# Patient Record
Sex: FEMALE | Race: WHITE | Employment: UNEMPLOYED | URBAN - METROPOLITAN AREA
[De-identification: names, ages, dates, MRNs, and addresses within clinical notes are randomized per-mention and may not be internally consistent; named-entity substitution may affect disease eponyms.]

---

## 2020-08-10 ENCOUNTER — OFFICE VISIT (OUTPATIENT)
Dept: ORTHOPEDIC SURGERY | Age: 40
End: 2020-08-10
Payer: COMMERCIAL

## 2020-08-10 VITALS — BODY MASS INDEX: 23.05 KG/M2 | HEIGHT: 64 IN | WEIGHT: 135 LBS

## 2020-08-10 PROCEDURE — 99203 OFFICE O/P NEW LOW 30 MIN: CPT | Performed by: ORTHOPAEDIC SURGERY

## 2020-08-10 PROCEDURE — G8427 DOCREV CUR MEDS BY ELIG CLIN: HCPCS | Performed by: ORTHOPAEDIC SURGERY

## 2020-08-10 PROCEDURE — G8420 CALC BMI NORM PARAMETERS: HCPCS | Performed by: ORTHOPAEDIC SURGERY

## 2020-08-10 PROCEDURE — 4004F PT TOBACCO SCREEN RCVD TLK: CPT | Performed by: ORTHOPAEDIC SURGERY

## 2020-08-10 RX ORDER — METHYLPREDNISOLONE ACETATE 40 MG/ML
80 INJECTION, SUSPENSION INTRA-ARTICULAR; INTRALESIONAL; INTRAMUSCULAR; SOFT TISSUE ONCE
Status: COMPLETED | OUTPATIENT
Start: 2020-08-10 | End: 2020-08-10

## 2020-08-10 RX ORDER — BUPIVACAINE HYDROCHLORIDE 2.5 MG/ML
30 INJECTION, SOLUTION INFILTRATION; PERINEURAL ONCE
Status: COMPLETED | OUTPATIENT
Start: 2020-08-10 | End: 2020-08-10

## 2020-08-10 RX ORDER — LIDOCAINE HYDROCHLORIDE 10 MG/ML
20 INJECTION, SOLUTION INFILTRATION; PERINEURAL ONCE
Status: COMPLETED | OUTPATIENT
Start: 2020-08-10 | End: 2020-08-10

## 2020-08-10 RX ADMIN — BUPIVACAINE HYDROCHLORIDE 75 MG: 2.5 INJECTION, SOLUTION INFILTRATION; PERINEURAL at 17:11

## 2020-08-10 RX ADMIN — METHYLPREDNISOLONE ACETATE 80 MG: 40 INJECTION, SUSPENSION INTRA-ARTICULAR; INTRALESIONAL; INTRAMUSCULAR; SOFT TISSUE at 17:11

## 2020-08-10 RX ADMIN — LIDOCAINE HYDROCHLORIDE 20 ML: 10 INJECTION, SOLUTION INFILTRATION; PERINEURAL at 17:11

## 2020-08-10 NOTE — PROGRESS NOTES
CHIEF COMPLAINT:    Chief Complaint   Patient presents with    Shoulder Pain     LEFT SHOULDER PAIN       HISTORY OF PRESENT ILLNESS:                The patient is a 42 y.o. female this patient presents today for orthopedic evaluation of her life shoulder. She states that for about 2 months has had intermittent but progressively worsening shoulder pain. She denies any known precipitating event or trauma. She denies any constitutional symptoms. She states she does use her arms quite a bit at her job which further aggravates her symptoms. She denies any numbness or tingling  Past Medical History:   Diagnosis Date    Arthritis     states yes and takes advil    Depression     Hepatitis C     states Hep C    Heroin use     Migraine     MRSA (methicillin resistant staph aureus) culture positive 5/5/13    Positive right hand cx        Work Status: She works at [employer]    The pain assessment was noted & is as follows:  Pain Assessment  Location of Pain: Shoulder  Location Modifiers: Left  Severity of Pain: 4  Quality of Pain: Aching  Duration of Pain: Persistent  Frequency of Pain: Constant]      Work Status/Functionality:     Past Medical History: Medical history form was reviewed today & can be found in the media tab  Past Medical History:   Diagnosis Date    Arthritis     states yes and takes advil    Depression     Hepatitis C     states Hep C    Heroin use     Migraine     MRSA (methicillin resistant staph aureus) culture positive 5/5/13    Positive right hand cx      Past Surgical History:     Past Surgical History:   Procedure Laterality Date    HAND SURGERY  5/5/13    INCISION AND DRAINAGE RT HAND    TUBAL LIGATION       Current Medications:     Current Outpatient Medications:     sertraline (ZOLOFT) 25 MG tablet, Take 25 mg by mouth daily. , Disp: , Rfl:     lamoTRIgine (LAMICTAL) 25 MG tablet, Take 25 mg by mouth 2 times daily. , Disp: , Rfl:     cloNIDine (CATAPRES) 0.1 MG tablet, Take 1 tablet by mouth 3 times daily as needed (withdrawal symptoms). , Disp: 5 tablet, Rfl: 0    dicyclomine (BENTYL) 20 MG tablet, Take 1 tablet by mouth every 6 hours as needed (abdominal cramping/pain) for 15 doses. , Disp: 15 tablet, Rfl: 0    FLUoxetine (PROZAC) 20 MG capsule, Take 1 capsule by mouth daily. Indications: mood, Disp: 30 capsule, Rfl: 0    OLANZapine (ZYPREXA) 5 MG tablet, Take 1 tablet by mouth nightly. Take half in am and half in afternoon prn anxiety. Take whole tab at bedtime  Indications: mood, Disp: 60 tablet, Rfl: 0  Allergies:  Robaxin [methocarbamol]  Social History:    reports that she has been smoking cigarettes. She has a 1.00 pack-year smoking history. She has never used smokeless tobacco. She reports current drug use. She reports that she does not drink alcohol. Family History:   Family History   Problem Relation Age of Onset    Diabetes Mother     High Cholesterol Mother     Diabetes Father     High Blood Pressure Father        REVIEW OF SYSTEMS:   For new problems, a full review of systems will be found scanned in the patient's chart. CONSTITUTIONAL: Denies unexplained weight loss, fevers, chills   NEUROLOGICAL: Denies unsteady gait or progressive weakness  SKIN: Denies skin changes, delayed healing, rash, itching       PHYSICAL EXAM:    Vitals: Height 5' 4\" (1.626 m), weight 135 lb (61.2 kg). GENERAL EXAM:  · General Apparence: Patient is adequately groomed with no evidence of malnutrition. · Orientation: The patient is oriented to time, place and person. · Mood & Affect:The patient's mood and affect are appropriate       LEFT shoulder PHYSICAL EXAMINATION:  · Inspection: I do not see any erythema, no significant swelling appreciable, no ecchymosis or abrasion. · Palpation: Mild musculoskeletal tenderness through the trapezius musculature.   No bony tenderness at the distal acromion or AC joint    · Range of Motion: Full range of motion today, mild discomfort particularly at internal/external rotation extremes. · Strength: No gross weakness with resisted supraspinatus or infraspinatus testing, very mild discomfort. · Special Tests: She can feel touch throughout the LEFT upper extremity          · Skin:  There are no rashes, ulcerations or lesions. · There are no distal dysvascular changes     Gait & station: The patient ambulates today unassisted      Additional Examinations:        Right Upper Extremity:  Examination of the right upper extremity does not show any tenderness, deformity or injury. Range of motion is unremarkable. There is no gross instability. There are no rashes, ulcerations or lesions. Strength and tone are normal.      Diagnostic Testing: The following x rays were read and interpreted by myself      1.  3 x-ray views of the LEFT shoulder reviewed today, minimally slipped acromion. No significant degenerative changes or acute bony abnormalities. Orders     Orders Placed This Encounter   Procedures    XR SHOULDER LEFT (MIN 2 VIEWS)     Standing Status:   Future     Number of Occurrences:   1     Standing Expiration Date:   8/10/2021     Order Specific Question:   Reason for exam:     Answer:   PAIN         Assessment / Treatment Plan:     1. LEFT shoulder rotator cuff tendinitis    Discussed treatment options. We are going to try cortisone injection today and a home exercise program.  If symptoms persist, we may need to try some formal physical therapy    I discussed in detail the risk, benefits, and complications of an injection which included but are not limited to infection, skin reactions, hot swollen joints, and anaphylaxis with the patient. The patient verbalized good understanding and gave informed consent for the injection. The skin was prepped using sterile alcohol solution.  A sterile 22-gauge needle was inserted into the subacromial space and a mixture of 4 mL of 2% Carbocaine, 4 mL of 0.25% Marcaine, and 80 mg of Depo-Medrol was injected under sterile technique. The needle was withdrawn and the puncture site sealed with a Band-Aid. Technique: Under sterile conditions a [employer] ultrasound unit with a variable frequency (6.0-15.0 MHz) linear transducer was used to localize the placement of a 22-gauge needle into the Left  subacromial space. Findings: Successful needle placement for  subacromial space injection. Final images were taken and saved for permanent record. The patient tolerated the injection well. The patient was instructed to call the office immediately if there is any pain, redness, warmth, fever, or chills. I have personally performed and/or participated in the history, exam and medical decision making and agree with all pertinent clinical information. I have also reviewed and agree with the past medical, family and social history unless otherwise noted. This dictation was performed with a verbal recognition program (DRAGON) and it was checked for errors. It is possible that there are still dictated errors within this office note. If so, please bring any errors to my attention for an addendum. All efforts were made to ensure that this office note is accurate.           Kelly Lowe MD

## 2020-12-01 ENCOUNTER — TELEPHONE (OUTPATIENT)
Dept: ORTHOPEDIC SURGERY | Age: 40
End: 2020-12-01

## 2020-12-01 NOTE — TELEPHONE ENCOUNTER
Appointment Request     Patient requesting earlier appointment: Yes  Appointment offered to patient: NO  Patient Contact Number: [phone]

## 2020-12-02 ENCOUNTER — HOSPITAL ENCOUNTER (EMERGENCY)
Age: 40
Discharge: HOME | End: 2020-12-02
Attending: EMERGENCY MEDICINE
Payer: COMMERCIAL

## 2020-12-02 ENCOUNTER — APPOINTMENT (OUTPATIENT)
Dept: GENERAL RADIOLOGY | Age: 40
End: 2020-12-02
Payer: COMMERCIAL

## 2020-12-02 VITALS
TEMPERATURE: 97.9 F | OXYGEN SATURATION: 100 % | RESPIRATION RATE: 18 BRPM | SYSTOLIC BLOOD PRESSURE: 138 MMHG | BODY MASS INDEX: 21.26 KG/M2 | HEIGHT: 63 IN | HEART RATE: 65 BPM | WEIGHT: 120 LBS | DIASTOLIC BLOOD PRESSURE: 71 MMHG

## 2020-12-02 PROCEDURE — 99283 EMERGENCY DEPT VISIT LOW MDM: CPT

## 2020-12-02 PROCEDURE — 96372 THER/PROPH/DIAG INJ SC/IM: CPT

## 2020-12-02 PROCEDURE — 6360000002 HC RX W HCPCS: Performed by: EMERGENCY MEDICINE

## 2020-12-02 PROCEDURE — 6370000000 HC RX 637 (ALT 250 FOR IP): Performed by: EMERGENCY MEDICINE

## 2020-12-02 PROCEDURE — 73030 X-RAY EXAM OF SHOULDER: CPT

## 2020-12-02 RX ORDER — KETOROLAC TROMETHAMINE 30 MG/ML
15 INJECTION, SOLUTION INTRAMUSCULAR; INTRAVENOUS ONCE
Status: COMPLETED | OUTPATIENT
Start: 2020-12-02 | End: 2020-12-02

## 2020-12-02 RX ORDER — LIDOCAINE 4 G/G
1 PATCH TOPICAL ONCE
Status: DISCONTINUED | OUTPATIENT
Start: 2020-12-02 | End: 2020-12-02 | Stop reason: HOSPADM

## 2020-12-02 RX ADMIN — KETOROLAC TROMETHAMINE 15 MG: 30 INJECTION, SOLUTION INTRAMUSCULAR at 06:40

## 2020-12-02 ASSESSMENT — ENCOUNTER SYMPTOMS
COUGH: 0
WHEEZING: 0
DIARRHEA: 0
PHOTOPHOBIA: 0
NAUSEA: 0
RHINORRHEA: 0
ABDOMINAL DISTENTION: 0
SHORTNESS OF BREATH: 0
BACK PAIN: 0
VOMITING: 0

## 2020-12-02 ASSESSMENT — PAIN DESCRIPTION - ORIENTATION: ORIENTATION: RIGHT

## 2020-12-02 ASSESSMENT — PAIN SCALES - GENERAL: PAINLEVEL_OUTOF10: 10

## 2020-12-02 ASSESSMENT — PAIN DESCRIPTION - LOCATION: LOCATION: SHOULDER

## 2020-12-02 NOTE — ED PROVIDER NOTES
Emergency Department Provider Note  Location: [place] [address] ED  12/2/2020     Patient Identification  Sameera Singh is a 47 y.o. female    Chief Complaint  Shoulder Pain (pt current pt at [facility] for rotator cuff problem, pt had cortisone shot in august and has had woresening pain since friday, pt cannot get in until next wednesday)          HPI  (History provided by patient)  Patient is a 62-year-old female who presents with worsening left-sided shoulder pain over the past week. Patient reports that she has had \"problems with rotator cuff in the past\". She follows with [facility] orthopedics and is scheduled to be seen for this complaint next Wednesday. Patient states that over the past week or 2 she has worsening sharp pains over the lateral deltoid area with extending and abducting the shoulder. She does have normal range of motion. No new numbness weakness paralysis. No reported injuries. No swelling. No shortness of breath or chest pain no diaphoresis nausea or other autonomic symptoms. I have reviewed the following nursing documentation:  Allergies: Allergies   Allergen Reactions    Robaxin [Methocarbamol] Shortness Of Breath       Past medical history:  has a past medical history of Arthritis, Depression, Hepatitis C, Heroin use, Migraine, and MRSA (methicillin resistant staph aureus) culture positive (5/5/13). Past surgical history:  has a past surgical history that includes Hand surgery (5/5/13) and Tubal ligation. Home medications:   Prior to Admission medications    Medication Sig Start Date End Date Taking? Authorizing Provider   sertraline (ZOLOFT) 25 MG tablet Take 25 mg by mouth daily. Historical Provider, MD   lamoTRIgine (LAMICTAL) 25 MG tablet Take 25 mg by mouth 2 times daily. Historical Provider, MD   cloNIDine (CATAPRES) 0.1 MG tablet Take 1 tablet by mouth 3 times daily as needed (withdrawal symptoms).  9/25/13   Mary Balderrama MD dicyclomine (BENTYL) 20 MG tablet Take 1 tablet by mouth every 6 hours as needed (abdominal cramping/pain) for 15 doses. 9/25/13   Renate Mandujano MD   FLUoxetine (PROZAC) 20 MG capsule Take 1 capsule by mouth daily. Indications: mood 9/9/13   Giana Ching MD   OLANZapine (ZYPREXA) 5 MG tablet Take 1 tablet by mouth nightly. Take half in am and half in afternoon prn anxiety. Take whole tab at bedtime  Indications: mood 9/9/13   Giana Ching MD       Social history:  reports that she has been smoking cigarettes. She has a 1.00 pack-year smoking history. She has never used smokeless tobacco. She reports current drug use. She reports that she does not drink alcohol. Family history:    Family History   Problem Relation Age of Onset    Diabetes Mother     High Cholesterol Mother     Diabetes Father     High Blood Pressure Father          ROS  Review of Systems   Constitutional: Negative for chills and fever. HENT: Negative for congestion and rhinorrhea. Eyes: Negative for photophobia and visual disturbance. Respiratory: Negative for cough, shortness of breath and wheezing. Cardiovascular: Negative for chest pain and palpitations. Gastrointestinal: Negative for abdominal distention, diarrhea, nausea and vomiting. Genitourinary: Negative for dysuria and hematuria. Musculoskeletal: Positive for arthralgias. Negative for back pain and neck pain. Skin: Negative for rash and wound. Neurological: Negative for syncope and weakness. Psychiatric/Behavioral: Negative for agitation and confusion. Exam  ED Triage Vitals [12/02/20 0628]   BP Temp Temp Source Pulse Resp SpO2 Height Weight   138/71 97.9 °F (36.6 °C) Oral 65 18 100 % 5' 3\" (1.6 m) 120 lb (54.4 kg)       Physical Exam  Vitals signs and nursing note reviewed. Constitutional:       General: She is not in acute distress. Appearance: She is well-developed.    HENT:      Head: Normocephalic and external rotation, and transscapular Y-views of the left shoulder were performed. There is no acute fracture or dislocation. The [facility] and glenohumeral joints are preserved. No destructive osseous lesion is seen. No soft tissue abnormality is evident. The visualized left-sided ribs and left lung are unremarkable. No acute abnormality of the left shoulder. Labs  No results found for this visit on 12/02/20. MDM  Patient seen and evaluated. Relevant records reviewed. 45-year-old female presents with left-sided shoulder pain over the past 1 to 2 weeks worse with ranging the shoulder with suspected rotator cuff injury. On exam she is overall well-appearing no acute distress reassuring vital signs. She does have reproducible pain in her shoulder with abduction of the shoulder. This seems clearly musculoskeletal.  X-ray does not show any acute osseous injury. No concern for ACS or acute cardiopulmonary process. Recommended Lidoderm patches, NSAIDs, rice therapy. She will follow-up as scheduled. Return precautions discussed. Patient agreeable to plan expressed understanding to plan. 7:30 AM      Clinical Impression:  1. Acute pain of left shoulder          Disposition:  Discharge to home in good condition. Blood pressure 138/71, pulse 65, temperature 97.9 °F (36.6 °C), temperature source Oral, resp. rate 18, height 5' 3\" (1.6 m), weight 120 lb (54.4 kg), last menstrual period 11/15/2020, SpO2 100 %. Patient was given scripts for the following medications. I counseled patient how to take these medications. Discharge Medication List as of 12/2/2020  7:04 AM          Disposition referral (if applicable): [address]  [phone]    Call       [facility] orthopedics    Go in 1 week  As scheduled        Total critical care time is 0 minutes, which excludes separately billable procedures and updating family.  Time spent is specifically for management of the presenting complaint and symptoms initially, direct bedside care, reevaluation, review of records, and consultation. There was a high probability of clinically significant life-threatening deterioration in the patient's condition, which required my urgent intervention. This chart was generated in part by using Dragon Dictation system and may contain errors related to that system including errors in grammar, punctuation, and spelling, as well as words and phrases that may be inappropriate. If there are any questions or concerns please feel free to contact the dictating provider for clarification.      MD Arabella Garcia MD  12/02/20 5395

## 2020-12-09 ENCOUNTER — OFFICE VISIT (OUTPATIENT)
Dept: ORTHOPEDIC SURGERY | Age: 40
End: 2020-12-09
Payer: COMMERCIAL

## 2020-12-09 VITALS — WEIGHT: 120 LBS | BODY MASS INDEX: 21.26 KG/M2 | HEIGHT: 63 IN

## 2020-12-09 PROCEDURE — G8420 CALC BMI NORM PARAMETERS: HCPCS | Performed by: PHYSICIAN ASSISTANT

## 2020-12-09 PROCEDURE — G8427 DOCREV CUR MEDS BY ELIG CLIN: HCPCS | Performed by: PHYSICIAN ASSISTANT

## 2020-12-09 PROCEDURE — 4004F PT TOBACCO SCREEN RCVD TLK: CPT | Performed by: PHYSICIAN ASSISTANT

## 2020-12-09 PROCEDURE — G8484 FLU IMMUNIZE NO ADMIN: HCPCS | Performed by: PHYSICIAN ASSISTANT

## 2020-12-09 PROCEDURE — 99214 OFFICE O/P EST MOD 30 MIN: CPT | Performed by: PHYSICIAN ASSISTANT

## 2020-12-09 RX ORDER — BUPIVACAINE HYDROCHLORIDE 2.5 MG/ML
30 INJECTION, SOLUTION INFILTRATION; PERINEURAL ONCE
Status: COMPLETED | OUTPATIENT
Start: 2020-12-09 | End: 2020-12-09

## 2020-12-09 RX ORDER — METHYLPREDNISOLONE ACETATE 40 MG/ML
80 INJECTION, SUSPENSION INTRA-ARTICULAR; INTRALESIONAL; INTRAMUSCULAR; SOFT TISSUE ONCE
Status: COMPLETED | OUTPATIENT
Start: 2020-12-09 | End: 2020-12-09

## 2020-12-09 RX ORDER — LIDOCAINE HYDROCHLORIDE 10 MG/ML
20 INJECTION, SOLUTION INFILTRATION; PERINEURAL ONCE
Status: COMPLETED | OUTPATIENT
Start: 2020-12-09 | End: 2020-12-09

## 2020-12-09 RX ADMIN — LIDOCAINE HYDROCHLORIDE 20 ML: 10 INJECTION, SOLUTION INFILTRATION; PERINEURAL at 16:04

## 2020-12-09 RX ADMIN — METHYLPREDNISOLONE ACETATE 80 MG: 40 INJECTION, SUSPENSION INTRA-ARTICULAR; INTRALESIONAL; INTRAMUSCULAR; SOFT TISSUE at 16:04

## 2020-12-09 RX ADMIN — BUPIVACAINE HYDROCHLORIDE 75 MG: 2.5 INJECTION, SOLUTION INFILTRATION; PERINEURAL at 16:04

## 2020-12-09 NOTE — PROGRESS NOTES
CHIEF COMPLAINT:    Chief Complaint   Patient presents with    Shoulder Pain     CK LEFT SHOULDER. WENT TO ED ON 12/2/2020       HISTORY OF PRESENT ILLNESS:                The patient is a 47 y.o. female presents to clinic for evaluation of left shoulder pain. She is an established patient who was seen in the past for rotator cuff tendinitis. She was given a cortisone injection on 8/10/2020 with some home physical therapy exercises. This did help with her pain however, her pain is returned over the past week and increased in intensity. She denies any new injury. She points to the subacromial space for pain. Pain is worsened with any overhead movement or lifting. Past Medical History:   Diagnosis Date    Arthritis     states yes and takes advil    Depression     Hepatitis C     states Hep C    Heroin use     Migraine     MRSA (methicillin resistant staph aureus) culture positive 5/5/13    Positive right hand cx        Work Status: She works at a [employer] at [employer]    The pain assessment was noted & is as follows:   ]      Work Status/Functionality:     Past Medical History: Medical history form was reviewed today & can be found in the media tab  Past Medical History:   Diagnosis Date    Arthritis     states yes and takes advil    Depression     Hepatitis C     states Hep C    Heroin use     Migraine     MRSA (methicillin resistant staph aureus) culture positive 5/5/13    Positive right hand cx      Past Surgical History:     Past Surgical History:   Procedure Laterality Date    HAND SURGERY  5/5/13    INCISION AND DRAINAGE RT HAND    TUBAL LIGATION       Current Medications:     Current Outpatient Medications:     sertraline (ZOLOFT) 25 MG tablet, Take 25 mg by mouth daily. , Disp: , Rfl:     lamoTRIgine (LAMICTAL) 25 MG tablet, Take 25 mg by mouth 2 times daily. , Disp: , Rfl:     cloNIDine (CATAPRES) 0.1 MG tablet, Take 1 tablet by mouth 3 times daily as needed (withdrawal symptoms). , Disp: 5 tablet, Rfl: 0    dicyclomine (BENTYL) 20 MG tablet, Take 1 tablet by mouth every 6 hours as needed (abdominal cramping/pain) for 15 doses. , Disp: 15 tablet, Rfl: 0    FLUoxetine (PROZAC) 20 MG capsule, Take 1 capsule by mouth daily. Indications: mood, Disp: 30 capsule, Rfl: 0    OLANZapine (ZYPREXA) 5 MG tablet, Take 1 tablet by mouth nightly. Take half in am and half in afternoon prn anxiety. Take whole tab at bedtime  Indications: mood, Disp: 60 tablet, Rfl: 0  Allergies:  Robaxin [methocarbamol]  Social History:    reports that she has been smoking cigarettes. She has a 1.00 pack-year smoking history. She has never used smokeless tobacco. She reports current drug use. She reports that she does not drink alcohol. Family History:   Family History   Problem Relation Age of Onset    Diabetes Mother     High Cholesterol Mother     Diabetes Father     High Blood Pressure Father        REVIEW OF SYSTEMS:   For new problems, a full review of systems will be found scanned in the patient's chart. CONSTITUTIONAL: Denies unexplained weight loss, fevers, chills   NEUROLOGICAL: Denies unsteady gait or progressive weakness  SKIN: Denies skin changes, delayed healing, rash, itching       PHYSICAL EXAM:    Vitals: Height 5' 2.99\" (1.6 m), weight 120 lb (54.4 kg), last menstrual period 11/15/2020. GENERAL EXAM:  · General Apparence: Patient is adequately groomed with no evidence of malnutrition. · Orientation: The patient is oriented to time, place and person. · Mood & Affect:The patient's mood and affect are appropriate       Left shoulder PHYSICAL EXAMINATION:  · Inspection: No visible deformity.   No significant edema, erythema or ecchymosis    · Palpation: Tenderness to palpation at the subacromial space    · Range of Motion: She is able to perform overhead range of motion however, this is limited beyond 90 degrees secondary to pain    · Strength: No gross strength deficits are noted    · Special Tests: Empty can testing is negative. Isolated supraspinatus strength testing reproduces pain but no weakness. Pain with [place] testing. Negative crossarm testing. · Skin:  There are no rashes, ulcerations or lesions. · There are no dysvascular changes     Gait & station: Normal      Additional Examinations:        Right Upper Extremity:  Examination of the right upper extremity does not show any tenderness, deformity or injury. Range of motion is unremarkable. There is no gross instability. There are no rashes, ulcerations or lesions. Strength and tone are normal.      Diagnostic Testin. X-rays of the left shoulder were reviewed from her emergency room visit and reveal normal anatomy with no acute bony abnormalities    Orders     Orders Placed This Encounter   Procedures   Shanice Stallings PT [facility] Physical Therapy     Referral Priority:   Routine     Referral Type:   Eval and Treat     Referral Reason:   Specialty Services Required     Requested Specialty:   Physical Therapy     Number of Visits Requested:   1         Assessment / Treatment Plan:     1. Left shoulder rotator cuff tendinitis    We discussed treatment options and she is interested in repeating a cortisone injection to the shoulder. She tolerated this well in the past.  She is also interested in beginning some formal physical therapy to see if we can improve this injury further. I discussed with her that if her symptoms are not improved with this treatment, the next step would be to obtain a MRI of the left shoulder to rule out a tear of the rotator cuff. I discussed in detail the risk, benefits, and complications of an injection which included but are not limited to infection, skin reactions, hot swollen joints, and anaphylaxis with the patient. The patient verbalized good understanding and gave informed consent for the injection. The skin was prepped using sterile alcohol solution.  A sterile 22-gauge needle was inserted into the subacromial space and a mixture of 4 mL of 2% Carbocaine, 4 mL of 0.25% Marcaine, and 80 mg of Depo-Medrol was injected under sterile technique. The needle was withdrawn and the puncture site sealed with a Band-Aid. Technique: Under sterile conditions a [employer] ultrasound unit with a variable frequency (6.0-15.0 MHz) linear transducer was used to localize the placement of a 22-gauge needle into the left subacromial space. Findings: Successful needle placement for  subacromial space injection. Final images were taken and saved for permanent record. The patient tolerated the injection well. The patient was instructed to call the office immediately if there is any pain, redness, warmth, fever, or chills. Andrea Armendariz [facility]    This dictation was performed with a verbal recognition program [facility]) and it was checked for errors. It is possible that there are still dictated errors within this office note. If so, please bring any errors to my attention for an addendum. All efforts were made to ensure that this office note is accurate.

## 2020-12-09 NOTE — LETTER
[address] [place] [address] [address] [place] [address]  Phone: [phone]  Fax: [place] [address]        December 9, 2020     Patient: Mario Hsieh   YOB: 1980   Date of Visit: 12/9/2020       To Whom It May Concern: It is my medical opinion that Jose Hernandez was unable to work beginning 12/2/2020 through 12/13/2020 due to an injury. She may return to work on 12/14/2020. If you have any questions or concerns, please don't hesitate to call.     Sincerely,        IDALIA Guzman

## 2020-12-15 ENCOUNTER — HOSPITAL ENCOUNTER (OUTPATIENT)
Dept: PHYSICAL THERAPY | Age: 40
Setting detail: THERAPIES SERIES
Discharge: HOME | End: 2020-12-15
Payer: COMMERCIAL

## 2020-12-15 PROCEDURE — 97016 VASOPNEUMATIC DEVICE THERAPY: CPT | Performed by: SPECIALIST

## 2020-12-15 PROCEDURE — 97140 MANUAL THERAPY 1/> REGIONS: CPT | Performed by: SPECIALIST

## 2020-12-15 PROCEDURE — 97110 THERAPEUTIC EXERCISES: CPT | Performed by: SPECIALIST

## 2020-12-15 PROCEDURE — 97161 PT EVAL LOW COMPLEX 20 MIN: CPT | Performed by: SPECIALIST

## 2020-12-15 NOTE — PLAN OF CARE
[facility], [address] [facility] [address], [address]  Phone: [phone]   Fax:     [phone]                                                       Physical Therapy Certification    Dear  Dr Yonny Anderson,    We had the pleasure of evaluating the following patient for physical therapy services at [address] [place] [address]. A summary of our findings can be found in the initial assessment below. This includes our plan of care. If you have any questions or concerns regarding these findings, please do not hesitate to contact me at the office phone number checked above. Thank you for the referral.       Physician Signature:_______________________________Date:__________________  By signing above (or electronic signature), therapists plan is approved by physician      Patient: Nicol Knight   : 1980   MRN: [id number]  Referring Physician:  Dr Yonny Anderson      Evaluation Date: 12/15/2020      Medical Diagnosis Information:    M75.82 (ICD-10-CM) - Rotator cuff tendinitis, left                                             Insurance information:  [employer]     Precautions/ Contra-indications: none      C-SSRS Triggered by Intake questionnaire (Past 2 wk assessment):   [x] No, Questionnaire did not trigger screening.   [] Yes, Patient intake triggered further evaluation      [] C-SSRS Screening completed  [] PCP notified via Plan of Care  [] Emergency services notified     Latex Allergy:  [x]NO      []YES  Preferred Language for Healthcare:   [x]English       []other:    SUBJECTIVE: Patient states insidious onset L shoulder RTC tendonitis. Cortizone shot , 22.      Relevant Medical History: none  Functional Disability Index:   QuickDash 64%      Pain Scale: 4-5/10  Easing factors: advil  Provocative factors: overhead   Sleep      Type: []Constant   [x]Intermittent []Radiating []Localized []other:     Numbness/Tingling: none    Occupation/School: [employer]    Living Status/Prior Level of Function: Independent with ADLs and IADLs,     OBJECTIVE:     CERV ROM     Cervical Flexion WFL all motions     Cervical Extension     Cervical SB     Cervical rotation          ROM Left Right   Shoulder Flex 140    Shoulder Abd 75     Shoulder ER 40    Shoulder IR 40                   Strength  Left Right   Shoulder Flex 4    Shoulder Scap 4    Shoulder ER 4    Shoulder IR 4+                Reflexes/Sensation:    [x]Dermatomes/Myotomes intact    [x]Reflexes equal and normal bilaterally   []Other:    Joint mobility:    [x]Normal    []Hypo   []Hyper    Palpation: Tenderness UT, anterior shoulder     Functional Mobility/Transfers: WFL    Posture: WFL    Bandages/Dressings/Incisions: intact    Gait: (include devices/WB status): WNL    Orthopedic Special Tests: [place] +                       [x] Patient history, allergies, meds reviewed. Medical chart reviewed. See intake form. Review Of Systems (ROS):  [x]Performed Review of systems (Integumentary, CardioPulmonary, Neurological) by intake and observation. Intake form has been scanned into medical record. Patient has been instructed to contact their primary care physician regarding ROS issues if not already being addressed at this time.       Co-morbidities/Complexities (which will affect course of rehabilitation):   [x]None           Arthritic conditions   []Rheumatoid arthritis (M05.9)  []Osteoarthritis (M19.91)   Cardiovascular conditions   []Hypertension (I10)  []Hyperlipidemia (E78.5)  []Angina pectoris (I20)  []Atherosclerosis (I70)   Musculoskeletal conditions   []Disc pathology   []Congenital spine pathologies   []Prior surgical intervention  []Osteoporosis (M81.8)  []Osteopenia (M85.8)   Endocrine conditions   []Hypothyroid (E03.9)  []Hyperthyroid Gastrointestinal conditions   []Constipation (Z08.22)   Metabolic conditions []Morbid obesity (E66.01)  []Diabetes type 1(E10.65) or 2 (E11.65)   []Neuropathy (G60.9)     Pulmonary conditions   []Asthma (J45)  []Coughing   []COPD (J44.9)   Psychological Disorders  []Anxiety (F41.9)  []Depression (F32.9)   []Other:   []Other:          Barriers to/and or personal factors that will affect rehab potential:              []Age  []Sex              []Motivation/Lack of Motivation                        []Co-Morbidities              []Cognitive Function, education/learning barriers              []Environmental, home barriers              []profession/work barriers  []past PT/medical experience  []other:  Justification:      Falls Risk Assessment (30 days):   [x] Falls Risk assessed and no intervention required.   [] Falls Risk assessed and Patient requires intervention due to being higher risk   TUG score (>12s at risk):     [] Falls education provided, including       G-Codes:     QuickDash 64%      ASSESSMENT:   Functional Impairments   []Noted spinal or UE joint hypomobility   []Noted spinal or UE joint hypermobility   [x]Decreased UE functional ROM   [x]Decreased UE functional strength   []Abnormal reflexes/sensation/myotomal/dermatomal deficits   [x]Decreased RC/scapular/core strength and neuromuscular control   []other:      Functional Activity Limitations (from functional questionnaire and intake)   [x]Reduced ability to tolerate prolonged functional positions   []Reduced ability or difficulty with changes of positions or transfers between positions   [x]Reduced ability to maintain good posture and demonstrate good body mechanics with sitting, bending, and lifting   [x] Reduced ability or tolerance with driving and/or computer work   [x]Reduced ability to sleep   [x]Reduced ability to perform lifting, reaching, carrying tasks   [x]Reduced ability to tolerate impact through UE   [x]Reduced ability to reach behind back   [x]Reduced ability to  or hold objects   []Reduced ability to throw or toss an object   []other:    Participation Restrictions   [x]Reduced participation in self care activities   [x]Reduced participation in home management activities   [x]Reduced participation in work activities   []Reduced participation in social activities. []Reduced participation in sport/recreation activities. Classification:   []Signs/symptoms consistent with post-surgical status including decreased ROM, strength and function.   [x]Signs/symptoms consistent with joint sprain/strain   [x]Signs/symptoms consistent with shoulder impingement   []Signs/symptoms consistent with shoulder/elbow/wrist tendinopathy   [x]Signs/symptoms consistent with Rotator cuff tear   []Signs/symptoms consistent with labral tear   []Signs/symptoms consistent with postural dysfunction    []Signs/symptoms consistent with Glenohumeral IR Deficit - <45 degrees   []Signs/symptoms consistent with facet dysfunction of cervical/thoracic spine    []Signs/symptoms consistent with pathology which may benefit from Dry needling     []other:     Prognosis/Rehab Potential:      []Excellent   [x]Good    []Fair   []Poor    Tolerance of evaluation/treatment:    []Excellent   [x]Good    []Fair   []Poor    Physical Therapy Evaluation Complexity Justification  [x] A history of present problem with:  [x] no personal factors and/or comorbidities that impact the plan of care;  []1-2 personal factors and/or comorbidities that impact the plan of care  []3 personal factors and/or comorbidities that impact the plan of care  [x] An examination of body systems using standardized tests and measures addressing any of the following: body structures and functions (impairments), activity limitations, and/or participation restrictions;:  [x] a total of 1-2 or more elements   [] a total of 3 or more elements   [] a total of 4 or more elements   [x] A clinical presentation with:  [x] stable and/or uncomplicated characteristics   [] evolving clinical presentation with changing characteristics  [] unstable and unpredictable characteristics;   [x] Clinical decision making of [x] low, [] moderate, [] high complexity using standardized patient assessment instrument and/or measurable assessment of functional outcome. [x] EVAL (LOW) 17417 (typically 20 minutes face-to-face)  [] EVAL (MOD) 78915 (typically 30 minutes face-to-face)  [] EVAL (HIGH) 09104 (typically 45 minutes face-to-face)  [] RE-EVAL     PLAN:  Frequency/Duration:  1-2 days per week for 6-8 Weeks:  INTERVENTIONS:  [x] Therapeutic exercise including: strength training, ROM, for Upper extremity and core   [x]  NMR activation and proprioception for UE, scap and Core   [x] Manual therapy as indicated for shoulder, scapula and spine to include: Dry Needling/IASTM, STM, PROM, Gr I-IV mobilizations, manipulation. [x] Modalities as needed that may include: thermal agents, E-stim, Biofeedback, US, iontophoresis as indicated  [x] Patient education on joint protection, postural re-education, activity modification, progression of HEP. HEP instruction: Instructed and given handouts (see scanned forms)    GOALS:  Patient stated goal: sleep/ reach    Therapist goals for Patient:   Short Term Goals: To be achieved in: 2 weeks  1. Independent in HEP and progression per patient tolerance, in order to prevent re-injury. [x] Progressing: [] Met: [] Not Met: [] Adjusted     2. Patient will have a decrease in pain to facilitate improvement in movement, function, and ADLs as indicated by Functional Deficits. [x] Progressing: [] Met: [] Not Met: [] Adjusted     Long Term Goals: To be achieved in: 6-8 weeks  1. Disability index score of 32% or less for the DASH to assist with reaching prior level of function. [x] Progressing: [] Met: [] Not Met: [] Adjusted     2. Patient will demonstrate increased AROM to WNL to allow for proper joint functioning as indicated by patients Functional Deficits.    [x] Progressing: [] Met: [] Not Met: [] Adjusted     3. Patient will demonstrate an increase in Strength to 5/5 to allow for proper functional mobility as indicated by patients Functional Deficits. [x] Progressing: [] Met: [] Not Met: [] Adjusted     4. Patient will return to [facility] for  functional activities without increased symptoms or restriction. [x] Progressing: [] Met: [] Not Met: [] Adjusted     5.  Sleep/reach with min to no limitations (patient specific functional goal)    [x] Progressing: [] Met: [] Not Met: [] Adjusted      Electronically signed by:  Ashleigh Ling, PT

## 2020-12-15 NOTE — FLOWSHEET NOTE
verbal/tactile cueing for activities related to strengthening, flexibility, endurance, ROM  for improvements in scapular, scapulothoracic and UE control with self care, reaching, carrying, lifting, house/yardwork, driving/computer work.    [] (70184) Provided verbal/tactile cueing for activities related to improving balance, coordination, kinesthetic sense, posture, motor skill, proprioception  to assist with  scapular, scapulothoracic and UE control with self care, reaching, carrying, lifting, house/yardwork, driving/computer work. Therapeutic Activities:    [x] (89409 or 35651) Provided verbal/tactile cueing for activities related to improving balance, coordination, kinesthetic sense, posture, motor skill, proprioception and motor activation to allow for proper function of scapular, scapulothoracic and UE control with self care, carrying, lifting, driving/computer work.      Home Exercise Program:    [x] (57361) Reviewed/Progressed HEP activities related to strengthening, flexibility, endurance, ROM of scapular, scapulothoracic and UE control with self care, reaching, carrying, lifting, house/yardwork, driving/computer work  [] (43091) Reviewed/Progressed HEP activities related to improving balance, coordination, kinesthetic sense, posture, motor skill, proprioception of scapular, scapulothoracic and UE control with self care, reaching, carrying, lifting, house/yardwork, driving/computer work      Manual Treatments:  PROM / STM / Oscillations-Mobs:  G-I, II, III, IV (PA's, Inf., Post.)  [x] (08679) Provided manual therapy to mobilize soft tissue/joints of cervical/CT, scapular GHJ and UE for the purpose of modulating pain, promoting relaxation,  increasing ROM, reducing/eliminating soft tissue swelling/inflammation/restriction, improving soft tissue extensibility and allowing for proper ROM for normal function with self care, reaching, carrying, lifting, house/yardwork, driving/computer work    Modalities: [x] GAME READY (VASO)- for significant edema, swelling, pain control. Charges:  Timed Code Treatment Minutes: 25   Total Treatment Minutes: 55      [x] EVAL (LOW) 71096 (typically 20 minutes face-to-face)  [] EVAL (MOD) 57849 (typically 30 minutes face-to-face)  [] EVAL (HIGH) 26122 (typically 45 minutes face-to-face)  [] RE-EVAL     [x] FC(39690) x  1   [] IONTO  [] NMR (97452) x     [x] VASO  [x] Manual (55657) x 1    [] Other:  [] TA x      [] Mech Traction (51747)  [] ES(attended) (60239)      [] ES (un) (91603):    ASSESSMENT:  See eval      GOALS:      Patient stated goal: sleep/ reach    Therapist goals for Patient:   Short Term Goals: To be achieved in: 2 weeks  1. Independent in HEP and progression per patient tolerance, in order to prevent re-injury. [x] Progressing: [] Met: [] Not Met: [] Adjusted     2. Patient will have a decrease in pain to facilitate improvement in movement, function, and ADLs as indicated by Functional Deficits. [x] Progressing: [] Met: [] Not Met: [] Adjusted     Long Term Goals: To be achieved in: 6-8 weeks  1. Disability index score of 32% or less for the DASH to assist with reaching prior level of function. [x] Progressing: [] Met: [] Not Met: [] Adjusted     2. Patient will demonstrate increased AROM to WNL to allow for proper joint functioning as indicated by patients Functional Deficits. [x] Progressing: [] Met: [] Not Met: [] Adjusted     3. Patient will demonstrate an increase in Strength to 5/5 to allow for proper functional mobility as indicated by patients Functional Deficits. [x] Progressing: [] Met: [] Not Met: [] Adjusted     4. Patient will return to [facility] for  functional activities without increased symptoms or restriction. [x] Progressing: [] Met: [] Not Met: [] Adjusted     5.  Sleep/reach with min to no limitations (patient specific functional goal)    [x] Progressing: [] Met: [] Not Met: [] Adjusted              Overall Progression Towards Functional goals/ Treatment Progress Update:  [] Patient is progressing as expected towards functional goals listed. [] Progression is slowed due to complexities/Impairments listed. [] Progression has been slowed due to co-morbidities. [x] Plan just implemented, too soon to assess goals progression <30days   [] Goals require adjustment due to lack of progress  [] Patient is not progressing as expected and requires additional follow up with physician  [] Other    Prognosis for POC: [x] Good [] Fair  [] Poor      Patient requires continued skilled intervention: [x] Yes  [] No    Treatment/Activity Tolerance:  [x] Patient able to complete treatment  [] Patient limited by fatigue  [] Patient limited by pain    [] Patient limited by other medical complications  [] Other:       PLAN: See eval  [] Continue per plan of care [] Alter current plan (see comments above)  [x] Plan of care initiated [] Hold pending MD visit [] Discharge    Electronically signed by:  Piotr Ware PT    Note: If patient does not return for scheduled/ recommended follow up visits, this note will serve as a discharge from care along with most recent update on progress.

## 2020-12-22 ENCOUNTER — HOSPITAL ENCOUNTER (OUTPATIENT)
Dept: PHYSICAL THERAPY | Age: 40
Setting detail: THERAPIES SERIES
Discharge: HOME | End: 2020-12-22
Payer: COMMERCIAL

## 2020-12-22 PROCEDURE — 97110 THERAPEUTIC EXERCISES: CPT | Performed by: SPECIALIST

## 2020-12-22 PROCEDURE — 97112 NEUROMUSCULAR REEDUCATION: CPT | Performed by: SPECIALIST

## 2020-12-22 PROCEDURE — 97016 VASOPNEUMATIC DEVICE THERAPY: CPT | Performed by: SPECIALIST

## 2020-12-22 PROCEDURE — 97140 MANUAL THERAPY 1/> REGIONS: CPT | Performed by: SPECIALIST

## 2020-12-22 NOTE — FLOWSHEET NOTE
Game Ready  10 min         Therapeutic Exercise and NMR EXR  [x] (86972) Provided verbal/tactile cueing for activities related to strengthening, flexibility, endurance, ROM  for improvements in scapular, scapulothoracic and UE control with self care, reaching, carrying, lifting, house/yardwork, driving/computer work.    [] (44464) Provided verbal/tactile cueing for activities related to improving balance, coordination, kinesthetic sense, posture, motor skill, proprioception  to assist with  scapular, scapulothoracic and UE control with self care, reaching, carrying, lifting, house/yardwork, driving/computer work. Therapeutic Activities:    [x] (33433 or 06431) Provided verbal/tactile cueing for activities related to improving balance, coordination, kinesthetic sense, posture, motor skill, proprioception and motor activation to allow for proper function of scapular, scapulothoracic and UE control with self care, carrying, lifting, driving/computer work.      Home Exercise Program:    [x] (48662) Reviewed/Progressed HEP activities related to strengthening, flexibility, endurance, ROM of scapular, scapulothoracic and UE control with self care, reaching, carrying, lifting, house/yardwork, driving/computer work  [] (97704) Reviewed/Progressed HEP activities related to improving balance, coordination, kinesthetic sense, posture, motor skill, proprioception of scapular, scapulothoracic and UE control with self care, reaching, carrying, lifting, house/yardwork, driving/computer work      Manual Treatments:  PROM / STM / Oscillations-Mobs:  G-I, II, III, IV (PA's, Inf., Post.)  [x] (76609) Provided manual therapy to mobilize soft tissue/joints of cervical/CT, scapular GHJ and UE for the purpose of modulating pain, promoting relaxation,  increasing ROM, reducing/eliminating soft tissue swelling/inflammation/restriction, improving soft tissue extensibility and allowing for proper ROM for normal function with self care, reaching, carrying, lifting, house/yardwork, driving/computer work    Modalities:     [x] GAME READY (VASO)- for significant edema, swelling, pain control. Charges:  Timed Code Treatment Minutes: 38   Total Treatment Minutes: 48       [] EVAL (LOW) 96672 (typically 20 minutes face-to-face)  [] EVAL (MOD) 82804 (typically 30 minutes face-to-face)  [] EVAL (HIGH) 72075 (typically 45 minutes face-to-face)  [] RE-EVAL     [x] XA(75281) x  1   [] IONTO  [x] NMR (08519) x  1   [x] VASO  [x] Manual (30844) x 1    [] Other:  [] TA x      [] Mech Traction (21041)  [] ES(attended) (39284)      [] ES (un) (90965):    ASSESSMENT:  Soreness with all Ke[place] today. Relief with Game Ready      GOALS:      Patient stated goal: sleep/ reach    Therapist goals for Patient:   Short Term Goals: To be achieved in: 2 weeks  1. Independent in HEP and progression per patient tolerance, in order to prevent re-injury. [x] Progressing: [] Met: [] Not Met: [] Adjusted     2. Patient will have a decrease in pain to facilitate improvement in movement, function, and ADLs as indicated by Functional Deficits. [x] Progressing: [] Met: [] Not Met: [] Adjusted     Long Term Goals: To be achieved in: 6-8 weeks  1. Disability index score of 32% or less for the DASH to assist with reaching prior level of function. [x] Progressing: [] Met: [] Not Met: [] Adjusted     2. Patient will demonstrate increased AROM to WNL to allow for proper joint functioning as indicated by patients Functional Deficits. [x] Progressing: [] Met: [] Not Met: [] Adjusted     3. Patient will demonstrate an increase in Strength to 5/5 to allow for proper functional mobility as indicated by patients Functional Deficits. [x] Progressing: [] Met: [] Not Met: [] Adjusted     4. Patient will return to [facility] for  functional activities without increased symptoms or restriction. [x] Progressing: [] Met: [] Not Met: [] Adjusted     5.  Sleep/reach with min to no limitations (patient specific functional goal)    [x] Progressing: [] Met: [] Not Met: [] Adjusted              Overall Progression Towards Functional goals/ Treatment Progress Update:  [x] Patient is progressing as expected towards functional goals listed. [] Progression is slowed due to complexities/Impairments listed. [] Progression has been slowed due to co-morbidities. [] Plan just implemented, too soon to assess goals progression <30days   [] Goals require adjustment due to lack of progress  [] Patient is not progressing as expected and requires additional follow up with physician  [] Other    Prognosis for POC: [x] Good [] Fair  [] Poor      Patient requires continued skilled intervention: [x] Yes  [] No    Treatment/Activity Tolerance:  [x] Patient able to complete treatment  [] Patient limited by fatigue  [] Patient limited by pain    [] Patient limited by other medical complications  [] Other:       PLAN:   [x] Continue per plan of care [] Alter current plan (see comments above)  [] Plan of care initiated [] Hold pending MD visit [] Discharge    Electronically signed by:  Harrison Eason PT    Note: If patient does not return for scheduled/ recommended follow up visits, this note will serve as a discharge from care along with most recent update on progress.

## 2020-12-29 ENCOUNTER — HOSPITAL ENCOUNTER (OUTPATIENT)
Dept: PHYSICAL THERAPY | Age: 40
Setting detail: THERAPIES SERIES
Discharge: HOME | End: 2020-12-29
Payer: COMMERCIAL

## 2020-12-29 PROCEDURE — 97112 NEUROMUSCULAR REEDUCATION: CPT | Performed by: SPECIALIST

## 2020-12-29 PROCEDURE — 97016 VASOPNEUMATIC DEVICE THERAPY: CPT | Performed by: SPECIALIST

## 2020-12-29 PROCEDURE — 97140 MANUAL THERAPY 1/> REGIONS: CPT | Performed by: SPECIALIST

## 2020-12-29 PROCEDURE — 97110 THERAPEUTIC EXERCISES: CPT | Performed by: SPECIALIST

## 2020-12-29 NOTE — FLOWSHEET NOTE
723 [facility], [address] [place] [address] [address]  Phone: [phone]   Fax:     [phone]      Physical Therapy Treatment Note/ Progress Report:     Date:  2020    Patient Name:  Iveth Cameron    :  1980  MRN: [id number]  Restrictions/Precautions:    Medical/Treatment Diagnosis Information:  ·   M75.82 (ICD-10-CM) - Rotator cuff tendinitis, left     Insurance/Certification information:   [facility]  Physician Information:   Dr Jasmin Griggs  Has the plan of care been signed (Y/N):        []  Yes  [x]  No     Date of Patient follow up with Physician: NS      Is this a Progress Report:     []  Yes  [x]  No        If Yes:  Date Range for reporting period:  Beginning 12/15/20  Ending 1/15/21    Progress report will be due (10 Rx or 30 days whichever is less):         Recertification will be due (POC Duration  / 90 days whichever is less):  3/15/21       Visit # Insurance Allowable Auth Required   3 [employer] 30 yr []  Yes [x]  No        Functional Scale: QuickDash 64%   Date assessed:  12/15/20      Latex Allergy:  [x]NO      []YES  Preferred Language for Healthcare:   [x]English       []other:      Pain level:  3-4/10     SUBJECTIVE:  Reports less pain with not working     OBJECTIVE:    Observation:    Test measurements:     20  Flex 125 abd 73    RESTRICTIONS/PRECAUTIONS: none    Exercises/Interventions:   Therapeutic Ex (24762) Sets/sec Reps Notes/CUES HEP   Supine  flex  10x2  x   Wall climb  5x  x   SL ER/ abd  10x2  x                 pulleys  4 min     PS 1# 10x  x                                      Manual Intervention (77309)       Gentle ROM   8 min                                        NMR re-education (39067)   CUES NEEDED    Wall push ups   10x2  x   Prone 0/rows/180  10x2  x   Ceiling punch  15x                                               Therapeutic Activity (35342) Game Ready  10 min         Therapeutic Exercise and NMR EXR  [x] (00398) Provided verbal/tactile cueing for activities related to strengthening, flexibility, endurance, ROM  for improvements in scapular, scapulothoracic and UE control with self care, reaching, carrying, lifting, house/yardwork, driving/computer work.    [] (06875) Provided verbal/tactile cueing for activities related to improving balance, coordination, kinesthetic sense, posture, motor skill, proprioception  to assist with  scapular, scapulothoracic and UE control with self care, reaching, carrying, lifting, house/yardwork, driving/computer work. Therapeutic Activities:    [x] (38922 or 25698) Provided verbal/tactile cueing for activities related to improving balance, coordination, kinesthetic sense, posture, motor skill, proprioception and motor activation to allow for proper function of scapular, scapulothoracic and UE control with self care, carrying, lifting, driving/computer work.      Home Exercise Program:    [x] (61433) Reviewed/Progressed HEP activities related to strengthening, flexibility, endurance, ROM of scapular, scapulothoracic and UE control with self care, reaching, carrying, lifting, house/yardwork, driving/computer work  [] (54709) Reviewed/Progressed HEP activities related to improving balance, coordination, kinesthetic sense, posture, motor skill, proprioception of scapular, scapulothoracic and UE control with self care, reaching, carrying, lifting, house/yardwork, driving/computer work      Manual Treatments:  PROM / STM / Oscillations-Mobs:  G-I, II, III, IV (PA's, Inf., Post.)  [x] (67120) Provided manual therapy to mobilize soft tissue/joints of cervical/CT, scapular GHJ and UE for the purpose of modulating pain, promoting relaxation,  increasing ROM, reducing/eliminating soft tissue swelling/inflammation/restriction, improving soft tissue extensibility and allowing for proper ROM for normal function with self care, reaching, carrying, lifting, house/yardwork, driving/computer work    Modalities:     [x] GAME READY (VASO)- for significant edema, swelling, pain control. Charges:  Timed Code Treatment Minutes: 38   Total Treatment Minutes: 48       [] EVAL (LOW) 25176 (typically 20 minutes face-to-face)  [] EVAL (MOD) 11370 (typically 30 minutes face-to-face)  [] EVAL (HIGH) 26093 (typically 45 minutes face-to-face)  [] RE-EVAL     [x] SF(48299) x  1   [] IONTO  [x] NMR (71138) x  1   [x] VASO  [x] Manual (98713) x 1    [] Other:  [] TA x      [] Mech Traction (15330)  [] ES(attended) (06644)      [] ES (un) (27975):    ASSESSMENT:  Soreness with all Keely today. Relief with Game Ready      GOALS:      Patient stated goal: sleep/ reach    Therapist goals for Patient:   Short Term Goals: To be achieved in: 2 weeks  1. Independent in HEP and progression per patient tolerance, in order to prevent re-injury. [x] Progressing: [] Met: [] Not Met: [] Adjusted     2. Patient will have a decrease in pain to facilitate improvement in movement, function, and ADLs as indicated by Functional Deficits. [x] Progressing: [] Met: [] Not Met: [] Adjusted     Long Term Goals: To be achieved in: 6-8 weeks  1. Disability index score of 32% or less for the DASH to assist with reaching prior level of function. [x] Progressing: [] Met: [] Not Met: [] Adjusted     2. Patient will demonstrate increased AROM to WNL to allow for proper joint functioning as indicated by patients Functional Deficits. [x] Progressing: [] Met: [] Not Met: [] Adjusted     3. Patient will demonstrate an increase in Strength to 5/5 to allow for proper functional mobility as indicated by patients Functional Deficits. [x] Progressing: [] Met: [] Not Met: [] Adjusted     4. Patient will return to [facility] for  functional activities without increased symptoms or restriction. [x] Progressing: [] Met: [] Not Met: [] Adjusted     5.  Sleep/reach with min to no limitations (patient specific functional goal)    [x] Progressing: [] Met: [] Not Met: [] Adjusted              Overall Progression Towards Functional goals/ Treatment Progress Update:  [x] Patient is progressing as expected towards functional goals listed. [] Progression is slowed due to complexities/Impairments listed. [] Progression has been slowed due to co-morbidities. [] Plan just implemented, too soon to assess goals progression <30days   [] Goals require adjustment due to lack of progress  [] Patient is not progressing as expected and requires additional follow up with physician  [] Other    Prognosis for POC: [x] Good [] Fair  [] Poor      Patient requires continued skilled intervention: [x] Yes  [] No    Treatment/Activity Tolerance:  [x] Patient able to complete treatment  [] Patient limited by fatigue  [] Patient limited by pain    [] Patient limited by other medical complications  [] Other:       PLAN:   [x] Continue per plan of care [] Alter current plan (see comments above)  [] Plan of care initiated [] Hold pending MD visit [] Discharge    Electronically signed by:  Alexandra Mcduffie PT    Note: If patient does not return for scheduled/ recommended follow up visits, this note will serve as a discharge from care along with most recent update on progress.

## 2021-01-04 DIAGNOSIS — S46.012D TRAUMATIC TEAR OF LEFT ROTATOR CUFF, UNSPECIFIED TEAR EXTENT, SUBSEQUENT ENCOUNTER: Primary | ICD-10-CM

## 2021-01-07 ENCOUNTER — HOSPITAL ENCOUNTER (OUTPATIENT)
Dept: PHYSICAL THERAPY | Age: 41
Setting detail: THERAPIES SERIES
Discharge: HOME | End: 2021-01-07
Payer: COMMERCIAL

## 2021-01-07 PROCEDURE — 97110 THERAPEUTIC EXERCISES: CPT | Performed by: SPECIALIST

## 2021-01-07 PROCEDURE — 97016 VASOPNEUMATIC DEVICE THERAPY: CPT | Performed by: SPECIALIST

## 2021-01-07 PROCEDURE — 97112 NEUROMUSCULAR REEDUCATION: CPT | Performed by: SPECIALIST

## 2021-01-07 PROCEDURE — 97140 MANUAL THERAPY 1/> REGIONS: CPT | Performed by: SPECIALIST

## 2021-01-07 NOTE — FLOWSHEET NOTE
723 [facility], [address] [place] [address] [address]  Phone: [phone]   Fax:     [phone]      Physical Therapy Treatment Note/ Progress Report:     Date:  2021    Patient Name:  Marzena Islas    :  1980  MRN: [id number]  Restrictions/Precautions:    Medical/Treatment Diagnosis Information:  ·   M75.82 (ICD-10-CM) - Rotator cuff tendinitis, left     Insurance/Certification information:   [facility]  Physician Information:   Dr Miles Arora  Has the plan of care been signed (Y/N):        []  Yes  [x]  No     Date of Patient follow up with Physician: NS      Is this a Progress Report:     []  Yes  [x]  No        If Yes:  Date Range for reporting period:  Beginning 12/15/20  Ending 1/15/21    Progress report will be due (10 Rx or 30 days whichever is less):         Recertification will be due (POC Duration  / 90 days whichever is less):  3/15/21       Visit # Insurance Allowable Auth Required   4 [facility] 30 yr []  Yes [x]  No        Functional Scale: QuickDash 64%   Date assessed:  12/15/20      Latex Allergy:  [x]NO      []YES  Preferred Language for Healthcare:   [x]English       []other:      Pain level:  3-4/10     SUBJECTIVE:  Reports less pain with not working   Awaiting MRI approval    OBJECTIVE:    Observation:    Test measurements:     20  Flex 125 abd 73    RESTRICTIONS/PRECAUTIONS: none    Exercises/Interventions:   Therapeutic Ex (29428) Sets/sec Reps Notes/CUES HEP   Supine  flex  10x2  x   Wall climb  5x  x   SL ER/ abd  10x2  x                 pulleys  4 min     PS 1# 10x  x                                      Manual Intervention (72652)       Gentle ROM   8 min                                        NMR re-education (51206)   CUES NEEDED    Wall push ups   10x2  x   Prone 0/rows/180  10x2  x   Ceiling punch 1# 15x     Ball on wall   15x                                         Therapeutic Activity (61607)                                          Game Ready  10 min         Therapeutic Exercise and NMR EXR  [x] (34469) Provided verbal/tactile cueing for activities related to strengthening, flexibility, endurance, ROM  for improvements in scapular, scapulothoracic and UE control with self care, reaching, carrying, lifting, house/yardwork, driving/computer work.    [] (13526) Provided verbal/tactile cueing for activities related to improving balance, coordination, kinesthetic sense, posture, motor skill, proprioception  to assist with  scapular, scapulothoracic and UE control with self care, reaching, carrying, lifting, house/yardwork, driving/computer work. Therapeutic Activities:    [x] (85941 or 38777) Provided verbal/tactile cueing for activities related to improving balance, coordination, kinesthetic sense, posture, motor skill, proprioception and motor activation to allow for proper function of scapular, scapulothoracic and UE control with self care, carrying, lifting, driving/computer work.      Home Exercise Program:    [x] (71121) Reviewed/Progressed HEP activities related to strengthening, flexibility, endurance, ROM of scapular, scapulothoracic and UE control with self care, reaching, carrying, lifting, house/yardwork, driving/computer work  [] (10742) Reviewed/Progressed HEP activities related to improving balance, coordination, kinesthetic sense, posture, motor skill, proprioception of scapular, scapulothoracic and UE control with self care, reaching, carrying, lifting, house/yardwork, driving/computer work      Manual Treatments:  PROM / STM / Oscillations-Mobs:  G-I, II, III, IV (PA's, Inf., Post.)  [x] (55349) Provided manual therapy to mobilize soft tissue/joints of cervical/CT, scapular GHJ and UE for the purpose of modulating pain, promoting relaxation,  increasing ROM, reducing/eliminating soft tissue swelling/inflammation/restriction, improving soft tissue extensibility and allowing for proper ROM for normal function with self care, reaching, carrying, lifting, house/yardwork, driving/computer work    Modalities:     [x] GAME READY (VASO)- for significant edema, swelling, pain control. Charges:  Timed Code Treatment Minutes: 38   Total Treatment Minutes: 48       [] EVAL (LOW) 49402 (typically 20 minutes face-to-face)  [] EVAL (MOD) 97941 (typically 30 minutes face-to-face)  [] EVAL (HIGH) 06121 (typically 45 minutes face-to-face)  [] RE-EVAL     [x] BLUM(78296) x  1   [] IONTO  [x] NMR (69331) x  1   [x] VASO  [x] Manual (61191) x 1    [] Other:  [] TA x      [] Mech Traction (95580)  [] ES(attended) (94908)      [] ES (un) (91886):    ASSESSMENT:  Soreness with all Keely today. Relief with Game Ready      GOALS:      Patient stated goal: sleep/ reach    Therapist goals for Patient:   Short Term Goals: To be achieved in: 2 weeks  1. Independent in HEP and progression per patient tolerance, in order to prevent re-injury. [x] Progressing: [] Met: [] Not Met: [] Adjusted     2. Patient will have a decrease in pain to facilitate improvement in movement, function, and ADLs as indicated by Functional Deficits. [x] Progressing: [] Met: [] Not Met: [] Adjusted     Long Term Goals: To be achieved in: 6-8 weeks  1. Disability index score of 32% or less for the DASH to assist with reaching prior level of function. [x] Progressing: [] Met: [] Not Met: [] Adjusted     2. Patient will demonstrate increased AROM to WNL to allow for proper joint functioning as indicated by patients Functional Deficits. [x] Progressing: [] Met: [] Not Met: [] Adjusted     3. Patient will demonstrate an increase in Strength to 5/5 to allow for proper functional mobility as indicated by patients Functional Deficits. [x] Progressing: [] Met: [] Not Met: [] Adjusted     4. Patient will return to [facility] for  functional activities without increased symptoms or restriction.    [x] Progressing: [] Met: [] Not Met: [] Adjusted     5. Sleep/reach with min to no limitations (patient specific functional goal)    [x] Progressing: [] Met: [] Not Met: [] Adjusted              Overall Progression Towards Functional goals/ Treatment Progress Update:  [x] Patient is progressing as expected towards functional goals listed. [] Progression is slowed due to complexities/Impairments listed. [] Progression has been slowed due to co-morbidities. [] Plan just implemented, too soon to assess goals progression <30days   [] Goals require adjustment due to lack of progress  [] Patient is not progressing as expected and requires additional follow up with physician  [] Other    Prognosis for POC: [x] Good [] Fair  [] Poor      Patient requires continued skilled intervention: [x] Yes  [] No    Treatment/Activity Tolerance:  [x] Patient able to complete treatment  [] Patient limited by fatigue  [] Patient limited by pain    [] Patient limited by other medical complications  [] Other:       PLAN:   [x] Continue per plan of care [] Alter current plan (see comments above)  [] Plan of care initiated [] Hold pending MD visit [] Discharge    Electronically signed by:  Nacho Green PT    Note: If patient does not return for scheduled/ recommended follow up visits, this note will serve as a discharge from care along with most recent update on progress.

## 2021-01-12 ENCOUNTER — HOSPITAL ENCOUNTER (OUTPATIENT)
Dept: PHYSICAL THERAPY | Age: 41
Setting detail: THERAPIES SERIES
Discharge: HOME | End: 2021-01-12
Payer: COMMERCIAL

## 2021-01-12 NOTE — FLOWSHEET NOTE
213 [facility], [address] [place] [address] [address]  Phone: [phone]   Fax:     [phone]    Physical Therapy  Cancellation/No-show Note  Patient Name:  Dandre Penaloza  :  1980   Date:  2021    Cancelled visits to date: 0  No-shows to date: 0    For today's appointment patient:  []  Cancelled  []  Rescheduled appointment  []  No-show     Reason given by patient:  []  Patient ill  []  Conflicting appointment  []  No transportation    []  Conflict with work  []  No reason given  [x]  Other:     Comments:  MRI ordered    Phone call information:   []  Phone call made today to patient at _ time at number provided:      []  Patient answered, conversation as follows:    []  Patient did not answer, message left as follows:  []  Phone call not made today  []  Phone call not needed - pt contacted us to cancel and provided reason for cancellation.      Electronically signed by:  Alon Dickson PT

## 2021-01-20 ENCOUNTER — TELEPHONE (OUTPATIENT)
Dept: ORTHOPEDIC SURGERY | Age: 41
End: 2021-01-20

## 2021-01-21 ENCOUNTER — TELEPHONE (OUTPATIENT)
Dept: ORTHOPEDIC SURGERY | Age: 41
End: 2021-01-21

## 2021-01-21 NOTE — TELEPHONE ENCOUNTER
CALLED LVM FOR PATIENT TODAY, STATING MRI IS APPROVED FOR [facility] [facility], & LEFT # FOR THEM TO CALL & SCHEDULE THAT. & TO MAKE AN FOLLOW UP APPT W/ DR. SCHROEDER AFTER MRI.  [employer]ia [employer] Deisy Chavez

## 2021-01-26 ENCOUNTER — TELEPHONE (OUTPATIENT)
Dept: ORTHOPEDIC SURGERY | Age: 41
End: 2021-01-26

## 2021-01-26 NOTE — TELEPHONE ENCOUNTER
Appointment Request     Patient requesting earlier appointment: Yes  Appointment offered to patient: NO  Patient Contact Number: [phone]    PATIENT CALLING TO GET TR MRI LT SHOULDER.  NO OFFICE VISITS AVAILABLE

## 2021-01-27 ENCOUNTER — OFFICE VISIT (OUTPATIENT)
Dept: ORTHOPEDIC SURGERY | Age: 41
End: 2021-01-27
Payer: COMMERCIAL

## 2021-01-27 VITALS — HEIGHT: 63 IN | BODY MASS INDEX: 21.26 KG/M2 | WEIGHT: 120 LBS

## 2021-01-27 DIAGNOSIS — M75.82 ROTATOR CUFF TENDINITIS, LEFT: Primary | ICD-10-CM

## 2021-01-27 PROCEDURE — G8420 CALC BMI NORM PARAMETERS: HCPCS | Performed by: ORTHOPAEDIC SURGERY

## 2021-01-27 PROCEDURE — 4004F PT TOBACCO SCREEN RCVD TLK: CPT | Performed by: ORTHOPAEDIC SURGERY

## 2021-01-27 PROCEDURE — G8484 FLU IMMUNIZE NO ADMIN: HCPCS | Performed by: ORTHOPAEDIC SURGERY

## 2021-01-27 PROCEDURE — L3660 SO 8 AB RSTR CAN/WEB PRE OTS: HCPCS | Performed by: ORTHOPAEDIC SURGERY

## 2021-01-27 PROCEDURE — G8427 DOCREV CUR MEDS BY ELIG CLIN: HCPCS | Performed by: ORTHOPAEDIC SURGERY

## 2021-01-27 PROCEDURE — 99213 OFFICE O/P EST LOW 20 MIN: CPT | Performed by: ORTHOPAEDIC SURGERY

## 2021-01-27 NOTE — PROGRESS NOTES
CHIEF COMPLAINT:    Chief Complaint   Patient presents with    Results     TR MRI LEFT SHOULDER       HISTORY OF PRESENT ILLNESS:    Alex Jenkins returned after a long course of treatment for her low shoulder. She had 2 corticosteroid injections for the one in August was good but the most recent one did not give her as much relief. She is got a lot of pain to get nighttime. She could not do her job as a  and had to give up her job because of her shoulder. She is ready to go ahead and pursue other surgical invention. The patient is a 47 y.o. female   Past Medical History:   Diagnosis Date    Arthritis     states yes and takes advil    Depression     Hepatitis C     states Hep C    Heroin use     Migraine     MRSA (methicillin resistant staph aureus) culture positive 5/5/13    Positive right hand cx        Work Status: The pain assessment was noted & is as follows:  Pain Assessment  Location of Pain: Shoulder  Location Modifiers: Left  Severity of Pain: 4  Quality of Pain: Aching  Duration of Pain: Persistent  Frequency of Pain: Constant]      Work Status/Functionality:     Past Medical History: Medical history form was reviewed today & can be found in the media tab  Past Medical History:   Diagnosis Date    Arthritis     states yes and takes advil    Depression     Hepatitis C     states Hep C    Heroin use     Migraine     MRSA (methicillin resistant staph aureus) culture positive 5/5/13    Positive right hand cx      Past Surgical History:     Past Surgical History:   Procedure Laterality Date    HAND SURGERY  5/5/13    INCISION AND DRAINAGE RT HAND    TUBAL LIGATION       Current Medications:     Current Outpatient Medications:     sertraline (ZOLOFT) 25 MG tablet, Take 25 mg by mouth daily. , Disp: , Rfl:     lamoTRIgine (LAMICTAL) 25 MG tablet, Take 25 mg by mouth 2 times daily. , Disp: , Rfl:     cloNIDine (CATAPRES) 0.1 MG tablet, Take 1 tablet by mouth 3 times daily as needed (withdrawal symptoms). , Disp: 5 tablet, Rfl: 0    dicyclomine (BENTYL) 20 MG tablet, Take 1 tablet by mouth every 6 hours as needed (abdominal cramping/pain) for 15 doses. , Disp: 15 tablet, Rfl: 0    FLUoxetine (PROZAC) 20 MG capsule, Take 1 capsule by mouth daily. Indications: mood, Disp: 30 capsule, Rfl: 0    OLANZapine (ZYPREXA) 5 MG tablet, Take 1 tablet by mouth nightly. Take half in am and half in afternoon prn anxiety. Take whole tab at bedtime  Indications: mood, Disp: 60 tablet, Rfl: 0  Allergies:  Robaxin [methocarbamol]  Social History:    reports that she has been smoking cigarettes. She has a 1.00 pack-year smoking history. She has never used smokeless tobacco. She reports current drug use. She reports that she does not drink alcohol. Family History:   Family History   Problem Relation Age of Onset    Diabetes Mother     High Cholesterol Mother     Diabetes Father     High Blood Pressure Father        REVIEW OF SYSTEMS:   For new problems, a full review of systems will be found scanned in the patient's chart. CONSTITUTIONAL: Denies unexplained weight loss, fevers, chills   NEUROLOGICAL: Denies unsteady gait or progressive weakness  SKIN: Denies skin changes, delayed healing, rash, itching       PHYSICAL EXAM:    Vitals: Height 5' 2.99\" (1.6 m), weight 120 lb (54.4 kg). GENERAL EXAM:  · General Apparence: Patient is adequately groomed with no evidence of malnutrition. · Orientation: The patient is oriented to time, place and person. · Mood & Affect:The patient's mood and affect are appropriate       Lefton shoulder PHYSICAL EXAMINATION:  · Inspection: No visible asymmetry deformity or atrophy in this thin individual.    · Palpation: Tender in the subacromial region and minimally over the [facility] joint. · Range of Motion: Limited. She can only abduct to 90 with 60 degrees of external rotation and 55 degrees of internal rotation without pain.     · Strength: No gross motor weakness but she is not particular strong in any direction. · Special Tests: Positive supraspinal sign. Negative grind test.  No instability. · Skin:  There are no rashes, ulcerations or lesions. · There are no distal dysvascular changes     Gait & station:       Additional Examinations:        Right Lower Extremity: Examination of the right lower extremity does not show any tenderness, deformity or injury. Range of motion is unremarkable. There is no gross instability. There are no rashes, ulcerations or lesions. Strength and tone are normal.      Diagnostic Testing: The following x rays were read and interpreted by myself      1. MRI scan reviewed. The patient does have a mild cough tendinopathy and peritendinitis. No rotator cuff tear and some mild AC joint arthritis with associated capsulitis. Orders   No orders of the defined types were placed in this encounter. Assessment / Treatment Plan:     1. We do lengthy discussion regarding her chronic rotator cuff tendinitis. She is having of symptoms with activity of daily living that she wants to go ahead and pursue surgical intervention. Should be an examination anesthesia for arthroscopy of the Lefton shoulder, near acromioplasty and possible Alexander procedure. 2.  We discussed shoulder arthroscopy surgery in detail. We talked about the arthroscopic nature of the procedure, the portals utilized and what can be done through these portals. We also discussed concerns regarding surgery, specifically including infection, deep vein thrombosis, pulmonary dystrophy, arthrofibrosis, delayed rehabilitation, etc.  We also discussed the possibility of an interscalene block and that anesthesia personnel would talk to them about this procedure and any concerns in that regard.   We also touched briefly on the degree of rotator cuff damage that can be treated and labral damage that can be treated and how it is always possible that the injury is more severe than expected on clinical examination by MRI. The reality is that if there is a full thickness rotator cuff tear, the treatment is dramatically different and the rehabilitation much slower. The same would be true of labral pathology, whether it be an anterior labral tear or a slap lesion that would potentially require repair versus a lesion that can just be debrided. We also discussed prophylaxis in terms of positioning carefully intravenous antibiotics preoperative, etc. All questions were answered from the patient. 3. I have personally performed and/or participated in the history, exam and medical decision making and agree with all pertinent clinical information. I have also reviewed and agree with the past medical, family and social history unless otherwise noted. This dictation was performed with a verbal recognition program (DRAGON) and it was checked for errors. It is possible that there are still dictated errors within this office note. If so, please bring any errors to my attention for an addendum. All efforts were made to ensure that this office note is accurate.           Cipriano Oquendo MD

## 2021-02-25 ENCOUNTER — TELEPHONE (OUTPATIENT)
Dept: ORTHOPEDIC SURGERY | Age: 41
End: 2021-02-25

## 2021-02-25 ENCOUNTER — OFFICE VISIT (OUTPATIENT)
Dept: ORTHOPEDIC SURGERY | Age: 41
End: 2021-02-25
Payer: COMMERCIAL

## 2021-02-25 VITALS — WEIGHT: 120 LBS | HEIGHT: 63 IN | BODY MASS INDEX: 21.26 KG/M2

## 2021-02-25 DIAGNOSIS — M75.112 PARTIAL NONTRAUMATIC TEAR OF LEFT ROTATOR CUFF: ICD-10-CM

## 2021-02-25 DIAGNOSIS — M19.012 ARTHRITIS OF LEFT ACROMIOCLAVICULAR JOINT: ICD-10-CM

## 2021-02-25 DIAGNOSIS — M75.22 BICEPS TENDINITIS OF LEFT SHOULDER: Primary | ICD-10-CM

## 2021-02-25 DIAGNOSIS — S43.432A SUPERIOR LABRUM ANTERIOR-TO-POSTERIOR (SLAP) TEAR OF LEFT SHOULDER: ICD-10-CM

## 2021-02-25 PROCEDURE — 99214 OFFICE O/P EST MOD 30 MIN: CPT | Performed by: ORTHOPAEDIC SURGERY

## 2021-02-25 PROCEDURE — G8484 FLU IMMUNIZE NO ADMIN: HCPCS | Performed by: ORTHOPAEDIC SURGERY

## 2021-02-25 PROCEDURE — 4004F PT TOBACCO SCREEN RCVD TLK: CPT | Performed by: ORTHOPAEDIC SURGERY

## 2021-02-25 PROCEDURE — G8427 DOCREV CUR MEDS BY ELIG CLIN: HCPCS | Performed by: ORTHOPAEDIC SURGERY

## 2021-02-25 PROCEDURE — G8420 CALC BMI NORM PARAMETERS: HCPCS | Performed by: ORTHOPAEDIC SURGERY

## 2021-02-25 RX ORDER — DESVENLAFAXINE 25 MG/1
TABLET, EXTENDED RELEASE ORAL
COMMUNITY
Start: 2021-02-10

## 2021-02-25 RX ORDER — AMITRIPTYLINE HYDROCHLORIDE 150 MG/1
150 TABLET, FILM COATED ORAL DAILY PRN
COMMUNITY
End: 2021-03-03

## 2021-02-25 RX ORDER — AMOXICILLIN AND CLAVULANATE POTASSIUM 500; 125 MG/1; MG/1
TABLET, FILM COATED ORAL
COMMUNITY
Start: 2021-02-11 | End: 2021-03-03

## 2021-02-25 RX ORDER — DULOXETIN HYDROCHLORIDE 30 MG/1
CAPSULE, DELAYED RELEASE ORAL
COMMUNITY
Start: 2021-02-06 | End: 2021-03-03

## 2021-02-25 NOTE — LETTER
[x]Cobra passer                                        [x]Serfas Wand    INSTRUMENTS:  [x]Shoulder Basic tray for open biceps tenodesis    SUTURE: []#5 Ethibond  []#2 Ethibond  []#2 Quill  []#1 PDS                   [x]3-0 Vicryl  [x]3-0 Monocryl  []2-0 Nylon  []3-0 Nylon  []3-0 PDS                    []Dermabond  [x]Steri-strips (in half)  DRESSING:  []Prineo dermabond  [x]4x4 gauze  [x]ABDs  [x]Tegaderm  BRACE: [x]Shoulder Immob. (w/abd. pillow)  []Sling  [x]Ice Unit  []Ace-Wrap      []Rozina Biomet:  Syl Sahni [phone], Kenard Duverney. [email or web address]  []Medacta: Ashutosh Holley [phone], [email or web address]. [email or web address]  []Fx Shoulder: Vonnie Rebolledo [phone], [email or web address]  [x]Agnes: Gilbert Chopra [phone], [email or web address]  []Lafleur & Nephew: John Horton [phone]            Allison Dean MD  [facility] [employer] [facility]  2/25/2021       9:31 AM EST

## 2021-02-25 NOTE — PROGRESS NOTES
Dr Acevedo Doing      Date /Time 2/25/2021             9:24 AM EST  Name Judah Arana             1980   Location  [facility]  MRN [id number]                Chief Complaint   Patient presents with    Shoulder Pain     LEFT SHOULDER        History of Present Illness    Judah Arana is a 47 y.o. female who presents with  left Shoulder pain. Sent in consultation by [facility] (Inactive). She is Right-handed. Occupational activities: clerical work. Athletic/exercise activity: no sports. Injury Mechanism:  none. Worker's Comp. & legal issues:   none. Previous Treatments: Ice, Heat, NSAIDs, pain medication, Physical Therapy Extensive therapy and Cortisone Injections To cortisone injections to the left shoulder    She has had an extensive course of left shoulder pain, indolent onset, no evidence of acute trauma. She has pain since August 2020 when she got her first injection by Dr. Tom Serrano. Her second injection did not work well. She has had progressive pain that keeps her up at night occasionally notices some stiffness as a result. She localizes pain in the anterior aspect of the shoulder that radiates down to her bicep. She is scheduled to have an operation arthroscopic done by Dr. Tom Serrano but now does not accept her insurance. Past Medical History  Past Medical History:   Diagnosis Date    Arthritis     states yes and takes advil    Depression     Hepatitis C     states Hep C    Heroin use     Migraine     MRSA (methicillin resistant staph aureus) culture positive 5/5/13    Positive right hand cx     Past Surgical History:   Procedure Laterality Date    HAND SURGERY  5/5/13    INCISION AND DRAINAGE RT HAND    TUBAL LIGATION       Social History     Tobacco Use    Smoking status: Current Every Day Smoker     Packs/day: 1.00     Years: 1.00     Pack years: 1.00     Types: Cigarettes    Smokeless tobacco: Never Used   Substance Use Topics    Alcohol use:  No Current Outpatient Medications on File Prior to Visit   Medication Sig Dispense Refill    amitriptyline (ELAVIL) 150 MG tablet Take 150 mg by mouth daily as needed      amoxicillin-clavulanate (AUGMENTIN) 500-125 MG per tablet TAKE 1 TABLET BY MOUTH TWICE A DAY FOR 7 DAYS      desvenlafaxine succinate (PRISTIQ) 25 MG TB24 extended release tablet TAKE 1 TABLET BY MOUTH DAILY AT APPROXIMATELY THE SAME TIME EVERY DAY *REPLACES CYMBALTA      DULoxetine (CYMBALTA) 30 MG extended release capsule TAKE 1 CAPSULE BY MOUTH EVERY DAY      sertraline (ZOLOFT) 25 MG tablet Take 25 mg by mouth daily.  lamoTRIgine (LAMICTAL) 25 MG tablet Take 25 mg by mouth 2 times daily.  cloNIDine (CATAPRES) 0.1 MG tablet Take 1 tablet by mouth 3 times daily as needed (withdrawal symptoms). 5 tablet 0    dicyclomine (BENTYL) 20 MG tablet Take 1 tablet by mouth every 6 hours as needed (abdominal cramping/pain) for 15 doses. 15 tablet 0    FLUoxetine (PROZAC) 20 MG capsule Take 1 capsule by mouth daily. Indications: mood 30 capsule 0    OLANZapine (ZYPREXA) 5 MG tablet Take 1 tablet by mouth nightly. Take half in am and half in afternoon prn anxiety. Take whole tab at bedtime  Indications: mood 60 tablet 0     No current facility-administered medications on file prior to visit. ASCVD 10-YEAR RISK SCORE  The ASCVD Risk score (Karen Villatoro, et al., 2013) failed to calculate for the following reasons: The 2013 ASCVD risk score is only valid for ages 36 to 78     Review of Systems  10-point ROS is negative other than HPI. Physical Exam  Based off 1997 Exam Criteria  Ht 5' 2.99\" (1.6 m)   Wt 120 lb (54.4 kg)   BMI 21.26 kg/m²      Constitutional:       General: He is not in acute distress. Appearance: Normal appearance. Cardiovascular:      Rate and Rhythm: Normal rate and regular rhythm. Pulses: Normal pulses. Pulmonary:      Effort: Pulmonary effort is normal. No respiratory distress.    Neurological: Mental Status: He is alert and oriented to person, place, and time. Mental status is at baseline. Musculoskeletal:  Gait:  normal    Cervical Spine / Shoulder:      RIGHT  LEFT    Cervical Spine Exam  [] All Neg    [] All Neg     Spurling's  []  []Not tested   []  []Not tested    Terrazas's  []  []Not tested   []  []Not tested    Pain with rotation  []  []Not tested   []  []Not tested    Pain with lateral bending  []  []Not tested   []  []Not tested    Paraspinal muscle tenderness  [] Paraspinal  []Midline   [] Paraspinal  []Not tested    Sensation RIGHT  LEFT    Axillary  [x] Normal []Decreased    [x] Normal []Decreased   Musculocutaneous  [x] Normal  []Decreased   [x] Normal []Decreased   Median  [x] Normal []Decreased   [x] Normal []Decreased   Radial  [x] Normal  []Decreased   [x] Normal []Decreased   Ulnar  [x] Normal  []Decreased   [x] Normal []Decreased   Scapula       Position  [x]Nml  []low  [] lateral  [x]Nml  []low  [] lateral   Dyskinesia  []+ []Abn. Shrug   []+ []Abn. Shrug                     Winging     [x]None   []Med  []Lat   []Worse w/FE  []Med  []Lat  []Worse w/FE   Scapulothoracic Compress.    []Impr Pain  []Impr Motion  []Impr Pain []Impr Motion    Range of Motion Active Passive Active Passive   Forward Elevation 170  140    Abduction 100  100    External Rotation @ side 60  60    External Rotation @ 90 abd 90  90    Internal Rotation @ 90 abd 40  40    Internal Rotation Normal  Lumbar    End range of motion  [] Pain  [] Pain  [x] Pain  [] Pain   Strength RIGHT /5 LEFT /5   Abduction 5  4    External Rotation 5  4    Internal Rotation 5  5    Provocative Signs/Tests  [] All Neg   [x] +      [] -  [] All Neg   [x] +      [] -   Rotator Cuff Signs  [] All Neg  [] Not tested   [] All Neg  [] Not tested    Neer  [x]  []Not tested   [x]  []Not tested    David Self  [x]  []Not tested   [x]  []Not tested    Painful arc  []  []Not tested   [x]  []Not tested    Greater tuberosity tenderness  []  []Not tested   [x]  []Not tested    Drop arm  []  []Not tested  []  []Not tested   Superior Escape  []  []Not tested   []  []Not tested    ER Lag  []  []Not tested   []  []Not tested    Belly press  []  []Not tested   []  []Not tested    Lift-off  []  []Not tested   []  []Not tested    Bear hug  []  []Not tested   []  []Not tested    Biceps/Labral Signs  [] All Neg  [] Not tested   [] All Neg  [] Not tested    Wiggins's  []  []Not tested   []  []Not tested    Speed's  []  []Not tested   [x]  []Not tested    Dynamic Load Shift/Shear  []  []Not tested   [x]  []Not tested    Clicking/Popping  []  []Not tested  [x]  []Not tested   Bicipital groove tenderness  []  []Not tested   [x]  []Not tested    Selwyn  []  []Not tested   []  []Not tested    [facility] Joint Signs  [] All Neg  [] Not tested   [] All Neg  [] Not tested    [facility] joint tenderness  []  []Not tested   [x]  []Not tested    Cross-arm adduction pain  []  []Not tested   [x]  []Not tested    Instability Signs  [] All Neg  [] Not tested  [x] All Neg  [] Not tested   General laxity (thumb/elbow)  []  []Not tested   []  []Not tested    Hyperabduction  []  []Not tested   []  []Not tested    Sulcus []Side   []ER    []Side   []ER       Anterior apprehension  []  []Not tested   []  []Not tested    Relocation  []   []Not tested  []  []Not tested   Negative belly press test    Imaging  Left Shoulder: [facility] AT [place]  Radiographs: No evidence of fracture or dislocation, humeral head is well-aligned with glenoid, acromiohumeral index is well maintained, no glenohumeral joint degeneration, no AC joint degeneration  MRI: Humeral head is well centered on glenoid, Rotator cuff tendinopathy, bursitis, Biceps tendon is located within the groove; biceps degeneration, AC joint demonstrates arthrosis (loss of joint space, periarticular osteophytes), no significant glenohumeral degeneration  Rotator Cuff Tear:   Supraspinatus: interstitial  Subscapularis: interstitial and partial thickness  SLAP tear    Assessment and Plan  [facility] was seen today for shoulder pain. Diagnoses and all orders for this visit:    Biceps tendinitis of left shoulder    Superior labrum anterior-to-posterior (SLAP) tear of left shoulder    Arthritis of left acromioclavicular joint    Partial nontraumatic tear of left rotator cuff        I discussed with Loren Barillas that her symptoms, signs, and imaging are most consistent with rotator cuff partial tears, bursitis, biceps tendonitis/degeneration, SLAP tear, ac joint arthrosis and stiffness. We reviewed the natural history of these conditions and treatment options ranging from conservative measures (rest, icing, activity modification, physical therapy, pain meds, cortisone injection) to surgical options. Conservative measures have failed; she is not interested in cortisone injections. I think she is an appropriate candidate for surgery due to her young age, ongoing symptoms and dysfunction despite conservative measures. The procedure would be diagnostic arthroscopy, debridement, decompression, biceps tenodesis, AC joint resection and rotator cuff debridement vs. repair. Perioperative considerations include: Pre-operative clearance from medical subspecialty. We reviewed the risks, benefits, alternatives of this approach. We discussed risks including, but not limited to, bleeding, pain, infection, scarring, damage to the neurovascular structures, blood clots, pulmonary embolus, stiffness, incomplete relief of pain, and incomplete return of function. We also reviewed the surgical details, expected recovery, and rehabilitation (6-9 months). She expressed understanding and will undergo preoperative medical evaluation and optimization. We will plan for [facility] for arthroscopic left shoulder surgery, on March 10.       Electronically signed by Ninfa Catherine MD on 2/25/2021 at 9:29 AM  This dictation was generated by voice recognition computer software. Although all attempts are made to edit the dictation for accuracy, there may be errors in the transcription that are not intended.

## 2021-03-02 NOTE — PROGRESS NOTES
Anthony Castellon    Age 47 y.o.    female    1980    MRN [id number]    3/10/2021  Arrival Time_____________  OR Time____________120 Min     Procedure(s):  VIDEO ARTHROSCOPY LEFT SHOULDER,  ROTATOR CUFF REPAIR, EXTENSIVE DEBRIDEMENT, DECOMPRESSION OF SUBACROMIAL SPACE WITH PARTIAL ACROMIOPLASTY, DISTAL CLAVICLE EXCISION, BICEPS TENODESIS                      General    Surgeon(s): Abhi Morgan MD       Phone [phone] ([place])     [address] [place] [address]  Cell Work  _________________________________________________________________  _________________________________________________________________  _________________________________________________________________  _________________________________________________________________  _________________________________________________________________      PCP _____________________________ Phone_________________       H&P__________________Bringing    Chart            Epic  DOS     Called_______  EKG__________________Bringing    Chart            Epic  DOS     Called_______  LAB__________________ Bringing    Chart            Epic  DOS     Called_______  Cardiac Clearance_______Bringing    Chart            Epic      DOS       Called_______    Cardiologist________________________ Phone___________________________      ? Anabaptist concerns / Waiver on Chart            PAT Communications_____________  ? Pre-op Instructions Given [place]          ______________________________  ? Directions to Surgery Center                          ______________________________  ? Transportation Home_______________      _______________________________  ?  Crutches/Walker__________________        _______________________________      ________Pre-op Orders   _______Transcribed    _______Wt.  ________Pharmacy          _______SCD  ______VTE     ______Beta Blocker  ________Consent             ________TED Sil Hoit

## 2021-03-03 ENCOUNTER — HOSPITAL ENCOUNTER (OUTPATIENT)
Age: 41
Discharge: HOME | End: 2021-03-03
Payer: COMMERCIAL

## 2021-03-03 PROCEDURE — 87641 MR-STAPH DNA AMP PROBE: CPT

## 2021-03-03 NOTE — PROGRESS NOTES
Obstructive Sleep Apnea (ZANDER) Screening     Patient:  Niurka Medrano    YOB: 1980      Medical Record #:  [id number]                     Date:  3/3/2021     1. Are you a loud and/or regular snorer? []  Yes       [x] No    2. Have you been observed to gasp or stop breathing during sleep? []  Yes       [x] No    3. Do you feel tired or groggy upon awakening or do you awaken with a headache?           []  Yes       [] No    4. Are you often tired or fatigued during the wake time hours? []  Yes       [] No    5. Do you fall asleep sitting, reading, watching TV or driving? []  Yes       [] No    6. Do you often have problems with memory or concentration? []  Yes       [] No    **If patient's score is ? 3 they are considered high risk for ZANDER. An Anesthesia provider will evaluate the patient and develop a plan of care the day of surgery. Note:  If the patient's BMI is more than 35 kg m¯² , has neck circumference > 40 cm, and/or high blood pressure the risk is greater (© American Sleep Apnea Association, 2006).

## 2021-03-04 ENCOUNTER — OFFICE VISIT (OUTPATIENT)
Dept: PRIMARY CARE CLINIC | Age: 41
End: 2021-03-04
Payer: COMMERCIAL

## 2021-03-04 DIAGNOSIS — Z01.818 PREOP TESTING: Primary | ICD-10-CM

## 2021-03-04 LAB — MRSA SCREEN RT-PCR: NORMAL

## 2021-03-04 PROCEDURE — G8420 CALC BMI NORM PARAMETERS: HCPCS | Performed by: NURSE PRACTITIONER

## 2021-03-04 PROCEDURE — 99211 OFF/OP EST MAY X REQ PHY/QHP: CPT | Performed by: NURSE PRACTITIONER

## 2021-03-04 PROCEDURE — G8428 CUR MEDS NOT DOCUMENT: HCPCS | Performed by: NURSE PRACTITIONER

## 2021-03-04 NOTE — PROGRESS NOTES
Bety Piper received a viral test for COVID-19. They were educated on isolation and quarantine as appropriate. For any symptoms, they were directed to seek care from their PCP, given contact information to establish with a doctor, directed to an urgent care or the emergency room.

## 2021-03-04 NOTE — PATIENT INSTRUCTIONS

## 2021-03-05 LAB — SARS-COV-2, PCR: NOT DETECTED

## 2021-03-09 ENCOUNTER — ANESTHESIA EVENT (OUTPATIENT)
Dept: OPERATING ROOM | Age: 41
End: 2021-03-09
Payer: COMMERCIAL

## 2021-03-10 ENCOUNTER — HOSPITAL ENCOUNTER (OUTPATIENT)
Age: 41
Setting detail: OUTPATIENT SURGERY
Discharge: HOME | End: 2021-03-10
Attending: ORTHOPAEDIC SURGERY | Admitting: ORTHOPAEDIC SURGERY
Payer: COMMERCIAL

## 2021-03-10 ENCOUNTER — ANESTHESIA (OUTPATIENT)
Dept: OPERATING ROOM | Age: 41
End: 2021-03-10
Payer: COMMERCIAL

## 2021-03-10 VITALS
BODY MASS INDEX: 20.73 KG/M2 | DIASTOLIC BLOOD PRESSURE: 64 MMHG | SYSTOLIC BLOOD PRESSURE: 106 MMHG | OXYGEN SATURATION: 99 % | HEART RATE: 62 BPM | RESPIRATION RATE: 14 BRPM | HEIGHT: 63 IN | TEMPERATURE: 97 F | WEIGHT: 117 LBS

## 2021-03-10 VITALS
DIASTOLIC BLOOD PRESSURE: 59 MMHG | SYSTOLIC BLOOD PRESSURE: 108 MMHG | OXYGEN SATURATION: 98 % | RESPIRATION RATE: 3 BRPM | TEMPERATURE: 98.2 F

## 2021-03-10 DIAGNOSIS — M75.122 NONTRAUMATIC COMPLETE TEAR OF LEFT ROTATOR CUFF: Primary | ICD-10-CM

## 2021-03-10 LAB — PREGNANCY, URINE: NEGATIVE

## 2021-03-10 PROCEDURE — 7100000001 HC PACU RECOVERY - ADDTL 15 MIN: Performed by: ORTHOPAEDIC SURGERY

## 2021-03-10 PROCEDURE — 2580000003 HC RX 258: Performed by: ANESTHESIOLOGY

## 2021-03-10 PROCEDURE — 84703 CHORIONIC GONADOTROPIN ASSAY: CPT

## 2021-03-10 PROCEDURE — 2500000003 HC RX 250 WO HCPCS: Performed by: NURSE ANESTHETIST, CERTIFIED REGISTERED

## 2021-03-10 PROCEDURE — 29828 SHO ARTHRS SRG BICP TENODSIS: CPT | Performed by: ORTHOPAEDIC SURGERY

## 2021-03-10 PROCEDURE — 29826 SHO ARTHRS SRG DECOMPRESSION: CPT | Performed by: ORTHOPAEDIC SURGERY

## 2021-03-10 PROCEDURE — 29823 SHO ARTHRS SRG XTNSV DBRDMT: CPT | Performed by: ORTHOPAEDIC SURGERY

## 2021-03-10 PROCEDURE — 2580000003 HC RX 258: Performed by: ORTHOPAEDIC SURGERY

## 2021-03-10 PROCEDURE — 29827 SHO ARTHRS SRG RT8TR CUF RPR: CPT | Performed by: ORTHOPAEDIC SURGERY

## 2021-03-10 PROCEDURE — 6360000002 HC RX W HCPCS: Performed by: NURSE ANESTHETIST, CERTIFIED REGISTERED

## 2021-03-10 PROCEDURE — 7100000000 HC PACU RECOVERY - FIRST 15 MIN: Performed by: ORTHOPAEDIC SURGERY

## 2021-03-10 PROCEDURE — 6360000002 HC RX W HCPCS: Performed by: ORTHOPAEDIC SURGERY

## 2021-03-10 PROCEDURE — 3700000001 HC ADD 15 MINUTES (ANESTHESIA): Performed by: ORTHOPAEDIC SURGERY

## 2021-03-10 PROCEDURE — 3600000004 HC SURGERY LEVEL 4 BASE: Performed by: ORTHOPAEDIC SURGERY

## 2021-03-10 PROCEDURE — 29824 SHO ARTHRS SRG DSTL CLAVICLC: CPT | Performed by: ORTHOPAEDIC SURGERY

## 2021-03-10 PROCEDURE — 7100000011 HC PHASE II RECOVERY - ADDTL 15 MIN: Performed by: ORTHOPAEDIC SURGERY

## 2021-03-10 PROCEDURE — 3600000014 HC SURGERY LEVEL 4 ADDTL 15MIN: Performed by: ORTHOPAEDIC SURGERY

## 2021-03-10 PROCEDURE — 6360000002 HC RX W HCPCS: Performed by: ANESTHESIOLOGY

## 2021-03-10 PROCEDURE — 2709999900 HC NON-CHARGEABLE SUPPLY: Performed by: ORTHOPAEDIC SURGERY

## 2021-03-10 PROCEDURE — 6360000002 HC RX W HCPCS: Performed by: PHYSICIAN ASSISTANT

## 2021-03-10 PROCEDURE — 2580000003 HC RX 258: Performed by: PHYSICIAN ASSISTANT

## 2021-03-10 PROCEDURE — 7100000010 HC PHASE II RECOVERY - FIRST 15 MIN: Performed by: ORTHOPAEDIC SURGERY

## 2021-03-10 PROCEDURE — 6370000000 HC RX 637 (ALT 250 FOR IP): Performed by: ANESTHESIOLOGY

## 2021-03-10 PROCEDURE — C1713 ANCHOR/SCREW BN/BN,TIS/BN: HCPCS | Performed by: ORTHOPAEDIC SURGERY

## 2021-03-10 PROCEDURE — 2720000010 HC SURG SUPPLY STERILE: Performed by: ORTHOPAEDIC SURGERY

## 2021-03-10 PROCEDURE — 3700000000 HC ANESTHESIA ATTENDED CARE: Performed by: ORTHOPAEDIC SURGERY

## 2021-03-10 DEVICE — ICONIX 2 NEEDLES WITH INTELLIBRAID TECHNOLOGY, 2.3MM ANCHOR WITH 2.0MM XBRAID TT
Type: IMPLANTABLE DEVICE | Site: SHOULDER | Status: FUNCTIONAL
Brand: ICONIX

## 2021-03-10 DEVICE — ICONIX SPEED ANCHOR 2.3MM 2 STRANDS 2.0MM XBRAID TT SUTURE TAPE
Type: IMPLANTABLE DEVICE | Site: SHOULDER | Status: FUNCTIONAL
Brand: ICONIX

## 2021-03-10 RX ORDER — HYDRALAZINE HYDROCHLORIDE 20 MG/ML
5 INJECTION INTRAMUSCULAR; INTRAVENOUS EVERY 10 MIN PRN
Status: DISCONTINUED | OUTPATIENT
Start: 2021-03-10 | End: 2021-03-10 | Stop reason: HOSPADM

## 2021-03-10 RX ORDER — PROPOFOL 10 MG/ML
INJECTION, EMULSION INTRAVENOUS PRN
Status: DISCONTINUED | OUTPATIENT
Start: 2021-03-10 | End: 2021-03-10 | Stop reason: SDUPTHER

## 2021-03-10 RX ORDER — CYCLOBENZAPRINE HCL 5 MG
5 TABLET ORAL 2 TIMES DAILY PRN
Qty: 20 TABLET | Refills: 0 | Status: SHIPPED | OUTPATIENT
Start: 2021-03-10 | End: 2021-03-30

## 2021-03-10 RX ORDER — HYDROMORPHONE HCL 110MG/55ML
PATIENT CONTROLLED ANALGESIA SYRINGE INTRAVENOUS PRN
Status: DISCONTINUED | OUTPATIENT
Start: 2021-03-10 | End: 2021-03-10 | Stop reason: SDUPTHER

## 2021-03-10 RX ORDER — DOCUSATE SODIUM 100 MG/1
100 CAPSULE, LIQUID FILLED ORAL 2 TIMES DAILY PRN
COMMUNITY

## 2021-03-10 RX ORDER — MORPHINE SULFATE 2 MG/ML
1 INJECTION, SOLUTION INTRAMUSCULAR; INTRAVENOUS EVERY 5 MIN PRN
Status: DISCONTINUED | OUTPATIENT
Start: 2021-03-10 | End: 2021-03-10 | Stop reason: HOSPADM

## 2021-03-10 RX ORDER — GABAPENTIN 300 MG/1
300 CAPSULE ORAL ONCE
Status: COMPLETED | OUTPATIENT
Start: 2021-03-10 | End: 2021-03-10

## 2021-03-10 RX ORDER — MORPHINE SULFATE 2 MG/ML
2 INJECTION, SOLUTION INTRAMUSCULAR; INTRAVENOUS EVERY 5 MIN PRN
Status: DISCONTINUED | OUTPATIENT
Start: 2021-03-10 | End: 2021-03-10 | Stop reason: HOSPADM

## 2021-03-10 RX ORDER — LIDOCAINE HYDROCHLORIDE 10 MG/ML
2 INJECTION, SOLUTION INFILTRATION; PERINEURAL
Status: DISCONTINUED | OUTPATIENT
Start: 2021-03-10 | End: 2021-03-10 | Stop reason: HOSPADM

## 2021-03-10 RX ORDER — OXYCODONE HYDROCHLORIDE AND ACETAMINOPHEN 5; 325 MG/1; MG/1
2 TABLET ORAL PRN
Status: COMPLETED | OUTPATIENT
Start: 2021-03-10 | End: 2021-03-10

## 2021-03-10 RX ORDER — ROCURONIUM BROMIDE 10 MG/ML
INJECTION, SOLUTION INTRAVENOUS PRN
Status: DISCONTINUED | OUTPATIENT
Start: 2021-03-10 | End: 2021-03-10 | Stop reason: SDUPTHER

## 2021-03-10 RX ORDER — MAGNESIUM HYDROXIDE 1200 MG/15ML
LIQUID ORAL CONTINUOUS PRN
Status: COMPLETED | OUTPATIENT
Start: 2021-03-10 | End: 2021-03-10

## 2021-03-10 RX ORDER — SODIUM CHLORIDE, SODIUM LACTATE, POTASSIUM CHLORIDE, CALCIUM CHLORIDE 600; 310; 30; 20 MG/100ML; MG/100ML; MG/100ML; MG/100ML
INJECTION, SOLUTION INTRAVENOUS CONTINUOUS
Status: DISCONTINUED | OUTPATIENT
Start: 2021-03-10 | End: 2021-03-10 | Stop reason: HOSPADM

## 2021-03-10 RX ORDER — DOXEPIN HYDROCHLORIDE 10 MG/1
10 CAPSULE ORAL NIGHTLY
COMMUNITY

## 2021-03-10 RX ORDER — ASPIRIN 81 MG/1
81 TABLET, CHEWABLE ORAL 2 TIMES DAILY
Qty: 60 TABLET | Refills: 0 | Status: SHIPPED | OUTPATIENT
Start: 2021-03-10 | End: 2021-04-01

## 2021-03-10 RX ORDER — LIDOCAINE HYDROCHLORIDE 20 MG/ML
INJECTION, SOLUTION INFILTRATION; PERINEURAL PRN
Status: DISCONTINUED | OUTPATIENT
Start: 2021-03-10 | End: 2021-03-10 | Stop reason: SDUPTHER

## 2021-03-10 RX ORDER — PROMETHAZINE HYDROCHLORIDE 25 MG/ML
6.25 INJECTION, SOLUTION INTRAMUSCULAR; INTRAVENOUS
Status: DISCONTINUED | OUTPATIENT
Start: 2021-03-10 | End: 2021-03-10 | Stop reason: HOSPADM

## 2021-03-10 RX ORDER — ONDANSETRON 2 MG/ML
INJECTION INTRAMUSCULAR; INTRAVENOUS PRN
Status: DISCONTINUED | OUTPATIENT
Start: 2021-03-10 | End: 2021-03-10 | Stop reason: SDUPTHER

## 2021-03-10 RX ORDER — ONDANSETRON 2 MG/ML
4 INJECTION INTRAMUSCULAR; INTRAVENOUS PRN
Status: DISCONTINUED | OUTPATIENT
Start: 2021-03-10 | End: 2021-03-10 | Stop reason: HOSPADM

## 2021-03-10 RX ORDER — KETOROLAC TROMETHAMINE 30 MG/ML
INJECTION, SOLUTION INTRAMUSCULAR; INTRAVENOUS PRN
Status: DISCONTINUED | OUTPATIENT
Start: 2021-03-10 | End: 2021-03-10 | Stop reason: SDUPTHER

## 2021-03-10 RX ORDER — SODIUM CHLORIDE, SODIUM LACTATE, POTASSIUM CHLORIDE, AND CALCIUM CHLORIDE .6; .31; .03; .02 G/100ML; G/100ML; G/100ML; G/100ML
IRRIGANT IRRIGATION PRN
Status: DISCONTINUED | OUTPATIENT
Start: 2021-03-10 | End: 2021-03-10 | Stop reason: ALTCHOICE

## 2021-03-10 RX ORDER — DIPHENHYDRAMINE HYDROCHLORIDE 50 MG/ML
12.5 INJECTION INTRAMUSCULAR; INTRAVENOUS
Status: DISCONTINUED | OUTPATIENT
Start: 2021-03-10 | End: 2021-03-10 | Stop reason: HOSPADM

## 2021-03-10 RX ORDER — OXYCODONE HYDROCHLORIDE AND ACETAMINOPHEN 5; 325 MG/1; MG/1
1 TABLET ORAL PRN
Status: COMPLETED | OUTPATIENT
Start: 2021-03-10 | End: 2021-03-10

## 2021-03-10 RX ORDER — PHENYLEPHRINE HCL IN 0.9% NACL 1 MG/10 ML
SYRINGE (ML) INTRAVENOUS PRN
Status: DISCONTINUED | OUTPATIENT
Start: 2021-03-10 | End: 2021-03-10 | Stop reason: SDUPTHER

## 2021-03-10 RX ORDER — LABETALOL HYDROCHLORIDE 5 MG/ML
5 INJECTION, SOLUTION INTRAVENOUS EVERY 10 MIN PRN
Status: DISCONTINUED | OUTPATIENT
Start: 2021-03-10 | End: 2021-03-10 | Stop reason: HOSPADM

## 2021-03-10 RX ORDER — POLYETHYLENE GLYCOL 3350 17 G/17G
17 POWDER, FOR SOLUTION ORAL DAILY PRN
COMMUNITY

## 2021-03-10 RX ORDER — MEPERIDINE HYDROCHLORIDE 50 MG/ML
12.5 INJECTION INTRAMUSCULAR; INTRAVENOUS; SUBCUTANEOUS EVERY 5 MIN PRN
Status: DISCONTINUED | OUTPATIENT
Start: 2021-03-10 | End: 2021-03-10 | Stop reason: HOSPADM

## 2021-03-10 RX ORDER — DEXAMETHASONE SODIUM PHOSPHATE 10 MG/ML
INJECTION INTRAMUSCULAR; INTRAVENOUS PRN
Status: DISCONTINUED | OUTPATIENT
Start: 2021-03-10 | End: 2021-03-10 | Stop reason: SDUPTHER

## 2021-03-10 RX ORDER — ONDANSETRON 4 MG/1
4 TABLET, FILM COATED ORAL EVERY 8 HOURS PRN
Qty: 30 TABLET | Refills: 1 | Status: SHIPPED | OUTPATIENT
Start: 2021-03-10

## 2021-03-10 RX ORDER — ROPIVACAINE HYDROCHLORIDE 2 MG/ML
INJECTION, SOLUTION EPIDURAL; INFILTRATION; PERINEURAL PRN
Status: DISCONTINUED | OUTPATIENT
Start: 2021-03-10 | End: 2021-03-10 | Stop reason: ALTCHOICE

## 2021-03-10 RX ORDER — OXYCODONE HYDROCHLORIDE 5 MG/1
5 TABLET ORAL EVERY 6 HOURS PRN
Qty: 28 TABLET | Refills: 0 | Status: SHIPPED | OUTPATIENT
Start: 2021-03-10 | End: 2021-03-17

## 2021-03-10 RX ORDER — FENTANYL CITRATE 50 UG/ML
INJECTION, SOLUTION INTRAMUSCULAR; INTRAVENOUS PRN
Status: DISCONTINUED | OUTPATIENT
Start: 2021-03-10 | End: 2021-03-10 | Stop reason: SDUPTHER

## 2021-03-10 RX ADMIN — HYDROMORPHONE HYDROCHLORIDE 1 MG: 2 INJECTION, SOLUTION INTRAMUSCULAR; INTRAVENOUS; SUBCUTANEOUS at 10:30

## 2021-03-10 RX ADMIN — HYDROMORPHONE HYDROCHLORIDE 0.25 MG: 1 INJECTION, SOLUTION INTRAMUSCULAR; INTRAVENOUS; SUBCUTANEOUS at 11:39

## 2021-03-10 RX ADMIN — KETOROLAC TROMETHAMINE 30 MG: 30 INJECTION, SOLUTION INTRAMUSCULAR; INTRAVENOUS at 10:48

## 2021-03-10 RX ADMIN — VANCOMYCIN HYDROCHLORIDE 1000 MG: 1 INJECTION, POWDER, LYOPHILIZED, FOR SOLUTION INTRAVENOUS at 08:42

## 2021-03-10 RX ADMIN — SODIUM CHLORIDE, POTASSIUM CHLORIDE, SODIUM LACTATE AND CALCIUM CHLORIDE: 600; 310; 30; 20 INJECTION, SOLUTION INTRAVENOUS at 10:50

## 2021-03-10 RX ADMIN — OXYCODONE HYDROCHLORIDE AND ACETAMINOPHEN 1 TABLET: 5; 325 TABLET ORAL at 11:51

## 2021-03-10 RX ADMIN — SODIUM CHLORIDE, POTASSIUM CHLORIDE, SODIUM LACTATE AND CALCIUM CHLORIDE: 600; 310; 30; 20 INJECTION, SOLUTION INTRAVENOUS at 08:42

## 2021-03-10 RX ADMIN — Medication 100 MCG: at 09:42

## 2021-03-10 RX ADMIN — ONDANSETRON 4 MG: 2 INJECTION INTRAMUSCULAR; INTRAVENOUS at 08:59

## 2021-03-10 RX ADMIN — GABAPENTIN 300 MG: 300 CAPSULE ORAL at 08:48

## 2021-03-10 RX ADMIN — FENTANYL CITRATE 100 MCG: 50 INJECTION INTRAMUSCULAR; INTRAVENOUS at 08:55

## 2021-03-10 RX ADMIN — PROPOFOL 200 MG: 10 INJECTION, EMULSION INTRAVENOUS at 08:55

## 2021-03-10 RX ADMIN — VANCOMYCIN HYDROCHLORIDE 1000 G: 1 INJECTION, POWDER, LYOPHILIZED, FOR SOLUTION INTRAVENOUS at 08:53

## 2021-03-10 RX ADMIN — ROCURONIUM BROMIDE 50 MG: 10 SOLUTION INTRAVENOUS at 08:55

## 2021-03-10 RX ADMIN — HYDROMORPHONE HYDROCHLORIDE 0.25 MG: 1 INJECTION, SOLUTION INTRAMUSCULAR; INTRAVENOUS; SUBCUTANEOUS at 11:23

## 2021-03-10 RX ADMIN — LIDOCAINE HYDROCHLORIDE 80 MG: 20 INJECTION, SOLUTION INFILTRATION; PERINEURAL at 08:55

## 2021-03-10 RX ADMIN — Medication 100 MCG: at 09:46

## 2021-03-10 RX ADMIN — SUGAMMADEX 200 MG: 100 INJECTION, SOLUTION INTRAVENOUS at 11:01

## 2021-03-10 RX ADMIN — DEXAMETHASONE SODIUM PHOSPHATE 10 MG: 10 INJECTION INTRAMUSCULAR; INTRAVENOUS at 08:59

## 2021-03-10 RX ADMIN — HYDROMORPHONE HYDROCHLORIDE 1 MG: 2 INJECTION, SOLUTION INTRAMUSCULAR; INTRAVENOUS; SUBCUTANEOUS at 09:27

## 2021-03-10 RX ADMIN — Medication 100 MCG: at 09:44

## 2021-03-10 ASSESSMENT — PULMONARY FUNCTION TESTS
PIF_VALUE: 19
PIF_VALUE: 18
PIF_VALUE: 19
PIF_VALUE: 19
PIF_VALUE: 3
PIF_VALUE: 19
PIF_VALUE: 1
PIF_VALUE: 19
PIF_VALUE: 19
PIF_VALUE: 3
PIF_VALUE: 15
PIF_VALUE: 19
PIF_VALUE: 16
PIF_VALUE: 20
PIF_VALUE: 19
PIF_VALUE: 15
PIF_VALUE: 19
PIF_VALUE: 18
PIF_VALUE: 20
PIF_VALUE: 19
PIF_VALUE: 19
PIF_VALUE: 15
PIF_VALUE: 19
PIF_VALUE: 19
PIF_VALUE: 20
PIF_VALUE: 19
PIF_VALUE: 20
PIF_VALUE: 19
PIF_VALUE: 15
PIF_VALUE: 19
PIF_VALUE: 18
PIF_VALUE: 19
PIF_VALUE: 20
PIF_VALUE: 15
PIF_VALUE: 19
PIF_VALUE: 20
PIF_VALUE: 2
PIF_VALUE: 20
PIF_VALUE: 20
PIF_VALUE: 19
PIF_VALUE: 20
PIF_VALUE: 2
PIF_VALUE: 15
PIF_VALUE: 19
PIF_VALUE: 15
PIF_VALUE: 19
PIF_VALUE: 18
PIF_VALUE: 15
PIF_VALUE: 4
PIF_VALUE: 20

## 2021-03-10 ASSESSMENT — PAIN SCALES - GENERAL
PAINLEVEL_OUTOF10: 3
PAINLEVEL_OUTOF10: 6

## 2021-03-10 ASSESSMENT — PAIN DESCRIPTION - ORIENTATION
ORIENTATION: LEFT
ORIENTATION: LEFT

## 2021-03-10 ASSESSMENT — PAIN DESCRIPTION - LOCATION
LOCATION: SHOULDER
LOCATION: SHOULDER

## 2021-03-10 ASSESSMENT — PAIN DESCRIPTION - DESCRIPTORS: DESCRIPTORS: ACHING

## 2021-03-10 NOTE — INTERVAL H&P NOTE
Update History & Physical    The patient's History and Physical of today was reviewed with the patient and I examined the patient. There was no change. The surgical site was confirmed by the patient and me. Plan: The risks, benefits, expected outcome, and alternative to the recommended procedure have been discussed with the patient. Patient understands and wants to proceed with the procedure.      Electronically signed by Jonel Watts MD on 3/10/2021 at 8:39 AM

## 2021-03-10 NOTE — PROGRESS NOTES
Preoperative Screening for Elective Surgery/Invasive Procedures While COVID-19 present in the community     Have you tested positive or have been told to self-isolate for COVID-19 like symptoms within the past 28 days? NO   Do you currently have any of the following symptoms? No  o Fever >100.0 F or 99.9 F in immunocompromised patients? No  o New onset cough, shortness of breath or difficulty breathing? No  o New onset sore throat, myalgia (muscle aches and pains), headache, loss of taste/smell or diarrhea? No   Have you had a potential exposure to COVID-19 within the past 14 days by:  o Close contact with a confirmed case? No  o Close contact with a healthcare worker,  or essential infrastructure worker (grocery store, [employer], gas station, public utilities or transportation)? No  o Do you reside in a congregate setting such as; skilled nursing facility, adult home, correctional facility, homeless shelter or other institutional setting? No  o Have you had recent travel to a known COVID-19 hotspot? No    Indicate if the patient has a positive screen by answering yes to one or more of the above questions. Patients who test positive or screen positive prior to surgery or on the day of surgery should be evaluated in conjunction with the surgeon/proceduralist/anesthesiologist to determine the urgency of the procedure. Pre and post operative expectations and routines explained to patient, verbalized understanding.

## 2021-03-10 NOTE — ANESTHESIA PRE PROCEDURE
Department of Anesthesiology  Preprocedure Note       Name:  Merlin Basil   Age:  47 y.o.  :  1980                                          MRN:  [id number]         Date:  3/10/2021      Surgeon: Dang Carrero): Nazia Sharma MD    Procedure: Procedure(s):  VIDEO ARTHROSCOPY LEFT SHOULDER,  ROTATOR CUFF REPAIR, EXTENSIVE DEBRIDEMENT, DECOMPRESSION OF SUBACROMIAL SPACE WITH PARTIAL ACROMIOPLASTY, DISTAL CLAVICLE EXCISION, BICEPS TENODESIS    Medications prior to admission:   Prior to Admission medications    Medication Sig Start Date End Date Taking? Authorizing Provider   doxepin (SINEQUAN) 10 MG capsule Take 10 mg by mouth nightly   Yes Historical Provider, MD   docusate sodium (COLACE) 100 MG capsule Take 100 mg by mouth 2 times daily as needed for Constipation   Yes Historical Provider, MD   polyethylene glycol (GLYCOLAX) 17 g packet Take 17 g by mouth daily as needed for Constipation   Yes Historical Provider, MD   Buprenorphine HCl-Naloxone HCl (SUBOXONE SL) Place 15 mg under the tongue   Yes Historical Provider, MD   desvenlafaxine succinate (PRISTIQ) 25 MG TB24 extended release tablet TAKE 1 TABLET BY MOUTH DAILY AT APPROXIMATELY THE SAME TIME EVERY DAY *REPLACES CYMBALTA 2/10/21  Yes Historical Provider, MD       Current medications:    Current Facility-Administered Medications   Medication Dose Route Frequency Provider Last Rate Last Admin    vancomycin 1000 mg IVPB in 250 mL D5W addavial  1,000 mg Intravenous On Call to [address] [place] [address]        lidocaine 1 % injection 2 mL  2 mL Intradermal Once PRN Regi Reis MD        lactated ringers infusion   Intravenous Continuous Regi Reis MD           Allergies:     Allergies   Allergen Reactions    Robaxin [Methocarbamol] Shortness Of Breath       Problem List:    Patient Active Problem List   Diagnosis Code    Cellulitis L03.90       Past Medical History:        Diagnosis Date    Arthritis     states yes and takes advil    >60 09/05/2013    GLUCOSE 92 09/05/2013    PROT 7.4 09/05/2013    PROT 7.0 06/22/2012    CALCIUM 9.4 09/05/2013    BILITOT 0.23 09/05/2013    ALKPHOS 46 09/05/2013    AST 13 09/05/2013    ALT 12 09/05/2013       POC Tests: No results for input(s): POCGLU, POCNA, POCK, POCCL, POCBUN, POCHEMO, POCHCT in the last 72 hours. Coags: No results found for: PROTIME, INR, APTT    HCG (If Applicable):   Lab Results   Component Value Date    PREGTESTUR Negative 09/05/2013        ABGs: No results found for: PHART, PO2ART, STX7PIA, HHF9OYR, BEART, M7BMRVQI     Type & Screen (If Applicable):  No results found for: LABABO, LABRH    Drug/Infectious Status (If Applicable):  No results found for: HIV, HEPCAB    COVID-19 Screening (If Applicable):   Lab Results   Component Value Date    COVID19 Not Detected 03/04/2021         Anesthesia Evaluation  Patient summary reviewed no history of anesthetic complications:   Airway: Mallampati: II  TM distance: >3 FB   Neck ROM: full  Mouth opening: > = 3 FB Dental: normal exam         Pulmonary:Negative Pulmonary ROS and normal exam  breath sounds clear to auscultation                             Cardiovascular:Negative CV ROS  Exercise tolerance: good (>4 METS),           Rhythm: regular  Rate: normal                    Neuro/Psych:   Negative Neuro/Psych ROS  (+) headaches:, psychiatric history:            GI/Hepatic/Renal: Neg GI/Hepatic/Renal ROS  (+) hepatitis: C, liver disease:,           Endo/Other: Negative Endo/Other ROS                    Abdominal:           Vascular: negative vascular ROS. Pre-Operative Diagnosis: Bursitis of left shoulder [M75.52]; Bicipital tendinitis of left shoulder [M75.22]; Incomplete rotator cuff tear or rupture of left shoulder, not specified as traumatic [M75.112]; Arthropathy [M12.9]    47 y.o.   BMI:  Body mass index is 20.73 kg/m².      Vitals:    03/03/21 1017 03/10/21 0831   BP:  113/73   Pulse:  82   Resp:  18   Temp: 96.4 °F (35.8 °C)   TempSrc:  Temporal   SpO2:  98%   Weight: 120 lb (54.4 kg) 117 lb (53.1 kg)   Height: 5' 3\" (1.6 m) 5' 3\" (1.6 m)       Allergies   Allergen Reactions    Robaxin [Methocarbamol] Shortness Of Breath       Social History     Tobacco Use    Smoking status: Former Smoker     Packs/day: 1.00     Years: 1.00     Pack years: 1.00     Types: Cigarettes     Quit date: 3/3/2021     Years since quittin.0    Smokeless tobacco: Never Used   Substance Use Topics    Alcohol use: No       LABS:    CBC  Lab Results   Component Value Date/Time    WBC 7.1 2013 05:05 PM    HGB 11.9 (L) 2013 05:05 PM    HCT 36.2 2013 05:05 PM     2013 05:05 PM     RENAL  Lab Results   Component Value Date/Time     2013 05:05 PM    K 3.6 2013 05:05 PM     2013 05:05 PM    CO2 29 2013 05:05 PM    BUN 10 2013 05:05 PM    CREATININE 0.6 2013 05:05 PM    GLUCOSE 92 2013 05:05 PM     COAGS  No results found for: PROTIME, INR, APTT          Anesthesia Plan      general     ASA 2     (I discussed with the patient the risks and benefits of PIV, anesthesia, IV Narcotics, PACU. All questions were answered the patient agrees with the plan and wishes to proceed)  Induction: intravenous.                           Myron Barnard MD   3/10/2021

## 2021-03-10 NOTE — H&P
HISTORY AND PHYSICAL             Date: 3/10/2021        Patient Name: Dandre Penaloza     YOB: 1980      Age:  47 y.o. Chief Complaint   Left shoulder pain    History Obtained From   patient    History of Present Illness   Left shoulder pain indolent onset, history unchanged from previous note    Past Medical History     Past Medical History:   Diagnosis Date    Arthritis     states yes and takes advil    Depression     Hepatitis C     states Hep C resolved    Heroin use     Migraine     MRSA (methicillin resistant staph aureus) culture positive 5/5/13    Positive right hand cx        Past Surgical History     Past Surgical History:   Procedure Laterality Date    HAND SURGERY  5/5/13    INCISION AND DRAINAGE RT HAND    TUBAL LIGATION          Medications Prior to Admission     Prior to Admission medications    Medication Sig Start Date End Date Taking?  Authorizing Provider   doxepin (SINEQUAN) 10 MG capsule Take 10 mg by mouth nightly   Yes Historical Provider, MD   docusate sodium (COLACE) 100 MG capsule Take 100 mg by mouth 2 times daily as needed for Constipation   Yes Historical Provider, MD   polyethylene glycol (GLYCOLAX) 17 g packet Take 17 g by mouth daily as needed for Constipation   Yes Historical Provider, MD   Buprenorphine HCl-Naloxone HCl (SUBOXONE SL) Place 15 mg under the tongue   Yes Historical Provider, MD   desvenlafaxine succinate (PRISTIQ) 25 MG TB24 extended release tablet TAKE 1 TABLET BY MOUTH DAILY AT APPROXIMATELY THE SAME TIME EVERY DAY *REPLACES CYMBALTA 2/10/21  Yes Historical Provider, MD        Allergies   Robaxin [methocarbamol]    Social History     Social History     Tobacco History     Smoking Status  Former Smoker Quit date  3/3/2021 Smoking Frequency  1 pack/day for 1 years (1 pk yrs) Smoking Tobacco Type  Cigarettes    Smokeless Tobacco Use  Never Used          Alcohol History     Alcohol Use Status  No          Drug Use     Drug Use Status  Not Currently Comment  past - clean since 2014          Sexual Activity     Sexually Active  Yes Partners  Male                Family History     Family History   Problem Relation Age of Onset    Diabetes Mother     High Cholesterol Mother     Diabetes Father     High Blood Pressure Father     Cancer Father         lung       Review of Systems   Review of Systems    Physical Exam   /73   Pulse 82   Temp 96.4 °F (35.8 °C) (Temporal)   Resp 18   Ht 5' 3\" (1.6 m)   Wt 117 lb (53.1 kg)   LMP 03/09/2021 (Exact Date)   SpO2 98%   BMI 20.73 kg/m²     Physical Exam   Left shoulder: as pre previous note  Cardiac; RRR  REsp: nonlabored resp    Labs    No results found for this or any previous visit (from the past 24 hour(s)). Imaging/Diagnostics Last 24 Hours   No results found. Assessment    left shoulder pain, interstitial RTC tear, biceps tendonitis, AC joint arthritis    Plan   1.  Plan for arthroscopy today    Consultations Ordered:  None    Electronically signed by Brenda Brown MD on 3/10/21 at 8:38 AM EST

## 2021-03-10 NOTE — OP NOTE
Orthopaedic Surgery  Operative Report      Patient Name:  Garcia Reyez  Patient :  1980  MRN: [id number]    Date: 03/10/21     Pre-operative Diagnosis:   M75.122 Complete rotator cuff tear or rupture of left shoulder  M75.52 Bursitis of left shoulder  M75.22 Bicipital tendinitis, left shoulder  M65.9 Synovitis and tenosynovitis, unspecified  M12.9 Arthropathy, unspecified    Post-operative Diagnosis:    Same    Procedure: LEFT  20315 Arthroscopic Debridement, extensive  74521 Arthroscopic Decompression of subacromial space with partial acromioplasty  80340 Arthroscopic rotator cuff repair  30963 Arthroscopic distal clavicle excision  83820 Arthroscopic biceps tenodesis    Surgeon:  Surgeon(s) and Role:     * Alondra Jose MD - Primary    Assistant: Circulator: Isac Escalera RN  Surgical Assistant: Guerrero Long  Scrub Person First: Parvin Guthrie    Anesthesia: General endotracheal anesthesia and Intraoperative shoulder block Ropivacaine - supraspinatus, axillary, sherry-incisional    Estimated blood loss: Minimal    Specimens: * No specimens in log *    Complications: None    Drains: None    Condition: Stable    Implants:   Implant Name Type Inv.  Item Serial No.  Lot No. LRB No. Used Action   ANCHOR SUT OD23MM DBL LD ICONIX 2 NDL XBRAID TT  ANCHOR SUT OD23MM DBL LD ICONIX 2 NDL XBRAID TT  [employer]  Left 1 Implanted   ANCHOR SUT 2.3MM 2 STRND 2MM XBRAID TT SUT TAPE SPD ICONIX  ANCHOR SUT 2.3MM 2 STRND 2MM XBRAID TT SUT TAPE SPD ICONIX  [employer]WD [id number] Left 1 Implanted       Findings:   - Type II SL AP tear of biceps anchor, significant associated bicep synovitis  - Profound synovitis surrounding rotator interval and subacromial region  - Bony impingement of undersurface acromion -most profound  - Bony impingement of subcoracoid region  - RTC -full-thickness tear involving superior 10% of the subscapularis insertion, no evidence of tear in the supraspinatus or infraspinatus. - Cartilage -grade 2 chondral damage present in the glenoid, centrally worn  - Minimal labral fraying     Indications:   Patient has indolent onset of pain to her left shoulder. She underwent an MRI with the above findings. Preoperative examination confirmed subcoracoid impingement and acromioclavicular arthritis as a 2 most predominant driving diagnoses. She had some weakness with subscapularis provocative testing as well. She failed conservative measures early on with motion exercises. She had significant weakness and pain associated with this. She understood the risk benefits and alternatives and wanted undergo the above operation. Procedure Details:  I marked the LEFT shoulder as the operative site. Patient was then wheeled back to the operating room theater, laid supine on the table. General anesthesia was induced. Patient was positioned upright in a beachchair with the head adequately level with the torso. The neck was perfectly secured. All bony prominences were well-padded. Appropriate antibiotics were infused. Shoulder block:  30 cc of half percent ropivacaine was infused in 3 regions. The suprascapular nerve, the axillary nerve and the periincisional regions were each insufflated with 10 cc of ropivacaine, respectively. Shoulder arthroscopy:  I entered the shoulder with the posterior incision portal.  Blunt entry was made. Visualization was acquired. Triangulation was used to access anterior portal using spinal needle. Appropriate incision and cannula entry was made. Probe and radiofrequency ablator wand was then used to manipulate and instrument around the shoulder. We first performed a diagnostic arthroscopy. The above findings were noted. Arthroscopic assisted biceps tenodesis:  I detached the biceps insertion off the superior labrum.   Once arthroscopically cut, a mini open approach was then utilized subpectoral.  A diagonal incision was used inferior border of the pectoralis major. Blunt dissection was used to go through the pectoralis overlying fascia just inferior to the muscle belly. Further dissection was made laterally onto the bone. The muscle was retracted with a blunt retractor. The tendon was then visualized and retrieved via a right angle clamp. Care was taken to avoid major any neurovascular structures. A small osteotome was then used to freshen up the bicipital groove, host site of the tendon. The superior edge of the exposure was then drilled with an entry drill, then a iconix anchor was then placed. A reverse piercing suture grasper was then used to place several half hitches and full hitches and lasso sutures around the biceps tendon from one end of the suture limbs. The excess tendon was then excised. The other limbs were used as posts. The sutures were then tied as an onlay technique onto the bicipital groove. Appropriate tension was confirmed. Arthroscopic debridement, extensive:  I turned my attention back to the arthroscope. The intra-articular rotator interval was severely scarred into position. This was completely released and debrided thoroughly using wand ablator and oscillating shaver. The subscapularis tendon was visualized with clarity. The above findings were discovered. Arthroscopic decompression of subcoracoid impingement:  The interval between the coracoid and the lesser tuberosity, as well as the insertion of the subscapularis, was decreased. Preoperative MRI confirmed this finding in conjunction with fraying at the subscapularis. 1 ablator was then used to expose the distal tip of the coracoid. Care was taken to preserve the conjoined tendon insertion. Appropriate interval tissue was removed. High-speed arthroscopic bur was used to remove the impinging bone without violating the integrity of the coracoid itself.     Arthroscopic subacromial decompression:  I then turned my attention to the subacromial region. The scope was used to triangulate with an instrument with a lateral portal.  Radiofrequency ablation wand was used to perform a thorough bursectomy. Electro Bovie cautery was used to identify the anterior edge of the acromion. Small acromial osteophyte was identified. High-speed bur was used to remove this osteophyte and restore a flat roof to the shoulder. I also use electro Bovie cautery to debride into the acromioclavicular joint. Arthroscopic Distal clavicle excision:  I properly exposed the distal clavicle using wand ablator. Distal clavicular osteophyte was visualized. I used high-speed arthroscopic dolores to remove approximately 5-7 mm of distal clavicle bone. Appropriate resection was estimated using the thickness of the instrument used. Appropriate hemostasis was achieve with wand ablator. No evidence of AC joint instability was created. Arthroscopic rotator cuff repair: Subscapularis:  I visualized the subscapularis insertion onto the lesser tuberosity. There is a full-thickness tear, but involving 10% of the overall insertion. A self-tapping all suture anchor was then used through the anterior portal.  This had double loaded tape sutures in position. A retrograde retrieving passer was then utilized the appropriate insertion point of the subscapularis through its tendon to then create a combination of mattress and simple sutures down back onto its insertion point. The sutures were tied arthroscopically and cut appropriately. Appropriate tension was restored. The tensioning of the subscapularis was performed in neutral rotation. Closure:  I then removed the arthroscope. Appropriate irrigation was then performed for the biceps tendon incision. 2-0 interrupted Vicryls were used to close the subcutaneous region. A running 4-0 Monocryl approximate the skin followed by Dermabond and Steri-Strip dressing. The portal incisions were then closed with 3-0 Monocryl.   Steri-Strips were

## 2021-03-10 NOTE — ANESTHESIA POSTPROCEDURE EVALUATION
Department of Anesthesiology  Postprocedure Note    Patient: Josue Miller  MRN: [id number]  YOB: 1980  Date of evaluation: 3/10/2021  Time:  12:23 PM     Procedure Summary     Date: 03/10/21 Room / Location: [address] [place] [address]    Anesthesia Start: 1509 Anesthesia Stop: 8569    Procedure: VIDEO ARTHROSCOPY LEFT SHOULDER,  ROTATOR CUFF REPAIR, EXTENSIVE DEBRIDEMENT, DECOMPRESSION OF SUBACROMIAL SPACE WITH PARTIAL ACROMIOPLASTY, DISTAL CLAVICLE EXCISION, BICEPS TENODESIS (Left Shoulder) Diagnosis:       Bursitis of left shoulder      Bicipital tendinitis of left shoulder      Incomplete rotator cuff tear or rupture of left shoulder, not specified as traumatic      Arthropathy      (LEFT SHOULDER BURSITIS, TENDINITIS, ROTATOR CUFF TEAR, ARTHROPATHY)    Surgeons: Karlee Elias MD Responsible Provider: Braulio Mckeon MD    Anesthesia Type: general ASA Status: 2          Anesthesia Type: general    Giselle Phase I: Giselle Score: 10    Giselle Phase II: Giselle Score: 10    Last vitals: Reviewed and per EMR flowsheets.        Anesthesia Post Evaluation    Comments: Postoperative Anesthesia Note    Name:    Josue Miller  MRN:      [id number]    Patient Vitals in the past 12 hrs:  03/10/21 1215, BP:106/64, Pulse:62, Resp:14, SpO2:99 %  03/10/21 1200, BP:(!) 112/57, Pulse:68, Resp:11, SpO2:99 %  03/10/21 1145, BP:(!) 107/49, Temp:97 °F (36.1 °C), Pulse:62, Resp:23, SpO2:97 %  03/10/21 1130, BP:108/60, Pulse:61, Resp:19, SpO2:96 %  03/10/21 1125, BP:103/61, Pulse:74, Resp:14, SpO2:94 %  03/10/21 1120, BP:(!) 104/54, Pulse:69, Resp:15, SpO2:95 %  03/10/21 1115, BP:(!) 104/54, Pulse:69, Resp:11, SpO2:98 %  03/10/21 1112, BP:(!) 100/52, Pulse:68, Resp:14, SpO2:98 %  03/10/21 1109, BP:(!) 91/52, Temp:97.1 °F (36.2 °C), Temp src:Temporal, Pulse:69, Resp:12, SpO2:98 %  03/10/21 0831, BP:113/73, Temp:96.4 °F (35.8 °C), Temp src:Temporal, Pulse:82, Resp:18, SpO2:98 %, Height:5' 3\" (1.6 m), Weight:117 lb (53.1 kg)     LABS:    CBC  Lab Results       Component                Value               Date/Time                  WBC                      7.1                 09/05/2013 05:05 PM        HGB                      11.9 (L)            09/05/2013 05:05 PM        HCT                      36.2                09/05/2013 05:05 PM        PLT                      287                 09/05/2013 05:05 PM   RENAL  Lab Results       Component                Value               Date/Time                  NA                       141                 09/05/2013 05:05 PM        K                        3.6                 09/05/2013 05:05 PM        CL                       107                 09/05/2013 05:05 PM        CO2                      29                  09/05/2013 05:05 PM        BUN                      10                  09/05/2013 05:05 PM        CREATININE               0.6                 09/05/2013 05:05 PM        GLUCOSE                  92                  09/05/2013 05:05 PM   COAGS  No results found for: PROTIME, INR, APTT    Intake & Output:  @24HRIO@    Nausea & Vomiting:  No    Level of Consciousness:  Awake    Pain Assessment:  Adequate analgesia    Anesthesia Complications:  No apparent anesthetic complications    SUMMARY      Vital signs stable  OK to discharge from Stage I post anesthesia care.   Care transferred from Anesthesiology department on discharge from perioperative area

## 2021-03-30 ENCOUNTER — OFFICE VISIT (OUTPATIENT)
Dept: ORTHOPEDIC SURGERY | Age: 41
End: 2021-03-30

## 2021-03-30 VITALS — HEIGHT: 63 IN | WEIGHT: 117 LBS | BODY MASS INDEX: 20.73 KG/M2

## 2021-03-30 DIAGNOSIS — M75.22 BICEPS TENDINITIS OF LEFT SHOULDER: Primary | ICD-10-CM

## 2021-03-30 DIAGNOSIS — S46.012D TRAUMATIC TEAR OF LEFT ROTATOR CUFF, UNSPECIFIED TEAR EXTENT, SUBSEQUENT ENCOUNTER: ICD-10-CM

## 2021-03-30 PROCEDURE — 99024 POSTOP FOLLOW-UP VISIT: CPT | Performed by: ORTHOPAEDIC SURGERY

## 2021-03-30 RX ORDER — CYCLOBENZAPRINE HCL 5 MG
5 TABLET ORAL 2 TIMES DAILY PRN
Qty: 20 TABLET | Refills: 0 | Status: SHIPPED | OUTPATIENT
Start: 2021-03-30 | End: 2021-04-12

## 2021-03-30 NOTE — PROGRESS NOTES
Dr Abigail Foreman      Date /Time 3/30/2021       12:09 PM EDT  Name Alfred Quintanilla             1980   Location  [facility]  MRN [id number]                Chief Complaint   Patient presents with    Shoulder Pain     1st PO Left shoulder, Pain level 6        History of Present Illness      Alfred Quintanilla is a 47 y.o. female is here for post-op visit after LEFT    19475 Arthroscopic Debridement, extensive  87157 Arthroscopic Decompression of subacromial space with partial acromioplasty  92013 Arthroscopic rotator cuff repair  08265 Arthroscopic assisted biceps tenodesis    We did a small repair of the leading edge of the subscapularis tendon. The remaining part of her cuff was intact. She is done quite well with pain control postop. She already has some passive motion regained. She is happy with getting the sling off. Physical Exam    Based off 1997 Exam Criteria    Ht 5' 3\" (1.6 m)   Wt 117 lb (53.1 kg)   LMP 03/09/2021 (Exact Date)   BMI 20.73 kg/m²      Constitutional:       General: He is not in acute distress. Appearance: Normal appearance. LEFT Shoulder: incision clean, intact, healing appropriately. No surrounding  erythema or fluctuance. Neuro intact distal. No evidence of DVT. Passive motion 150 forward elevation, external rotation 20 degrees. Imaging       No new imaging    Assessment and Plan    Shelley Lopez was seen today for shoulder pain. Diagnoses and all orders for this visit:    Biceps tendinitis of left shoulder    Traumatic tear of left rotator cuff, unspecified tear extent, subsequent encounter      Can wean sling use  Start to normalize activities but no heavy lifting  Start physical therapy protocol now with passive motion first, followed by active assistive motion  Return in 3 months      Electronically signed by Abigail Foreman MD on 3/30/2021 at 12:09 PM  This dictation was generated by voice recognition computer software.   Although all attempts are made to edit the dictation for accuracy, there may be errors in the transcription that are not intended.

## 2021-03-31 ENCOUNTER — OFFICE VISIT (OUTPATIENT)
Dept: PRIMARY CARE CLINIC | Age: 41
End: 2021-03-31
Payer: COMMERCIAL

## 2021-03-31 ENCOUNTER — PREP FOR PROCEDURE (OUTPATIENT)
Dept: OBGYN | Age: 41
End: 2021-03-31

## 2021-03-31 DIAGNOSIS — Z01.818 PREOP TESTING: Primary | ICD-10-CM

## 2021-03-31 DIAGNOSIS — D06.9 CIN III WITH SEVERE DYSPLASIA: ICD-10-CM

## 2021-03-31 LAB — SARS-COV-2: NOT DETECTED

## 2021-03-31 PROCEDURE — 99211 OFF/OP EST MAY X REQ PHY/QHP: CPT | Performed by: NURSE PRACTITIONER

## 2021-03-31 PROCEDURE — G8428 CUR MEDS NOT DOCUMENT: HCPCS | Performed by: NURSE PRACTITIONER

## 2021-03-31 PROCEDURE — G8420 CALC BMI NORM PARAMETERS: HCPCS | Performed by: NURSE PRACTITIONER

## 2021-03-31 RX ORDER — SODIUM CHLORIDE 0.9 % (FLUSH) 0.9 %
10 SYRINGE (ML) INJECTION EVERY 12 HOURS SCHEDULED
Status: CANCELLED | OUTPATIENT
Start: 2021-03-31

## 2021-03-31 RX ORDER — SODIUM CHLORIDE, SODIUM LACTATE, POTASSIUM CHLORIDE, CALCIUM CHLORIDE 600; 310; 30; 20 MG/100ML; MG/100ML; MG/100ML; MG/100ML
INJECTION, SOLUTION INTRAVENOUS CONTINUOUS
Status: CANCELLED | OUTPATIENT
Start: 2021-03-31

## 2021-03-31 RX ORDER — SODIUM CHLORIDE 0.9 % (FLUSH) 0.9 %
10 SYRINGE (ML) INJECTION PRN
Status: CANCELLED | OUTPATIENT
Start: 2021-03-31

## 2021-03-31 NOTE — PATIENT INSTRUCTIONS

## 2021-03-31 NOTE — H&P
>      PATIENT:  Gutierrez Diaz  YOB: 1980  DATE:   2021 10:45 AM   VISIT TYPE: Office Visit - GYN        This 47year old female presents for severe dysplasia. History of Present Illness:  1.  severe dysplasia   32 y/o [id number], s/p tubal ligation, here for pre-op for LEEP due to biopsy indicating severe dysplasia. Screening Tools  Other Screenings:  Encounter Date Performed Date Instrument Score Severity/Interpretation MDD Classification   2021 Patient Health Questionnaire (PHQ-2) 0 Further testing is not required    2021 CAGE-AID Alcohol and Drug Screening 0 None    2021 SDOH 0 Intervention not needed      CAGE ALCOHOL SCREENING TEST      Felt need to cut down drinking? No       Ever felt annoyed by criticism of drinking? No       Had guilty feelings about drinking? No       Ever take morning eye-opener? No       Performed Date:       Score: 0        PAST MEDICAL/SURGICAL HISTORY   (Detailed)    Disease/disorder Onset Date Management Date Comments   Abnormal pap smear - LGSIL w/ + HR HPV 2016        Tubal ligation              Rotator Cuff Repair - Left     vaginal deliveries x2         GYNECOLOGIC HISTORY:  Patient is premenopausal. Last menses was 2021. Menarche:  17 y.o. (onset)  Patient has not had a hysterectomy. Date of last PAP: 2021. OBSTETRIC HISTORY:  Not currently pregnant. :4.      PARITY:   Term:2.  Pre-Term: 0. : 2. Livin. SVDs: 2. Spontaneous abortions: 2. PROBLEM LIST:     Problem Description Onset Date Chronic Clinical Status Notes   Bipolar disorder 03/10/2014 Y  Mapped from [facility] Chronic Conditions table on 2014 by the ICD9 to SNOMED Bulk Mapping Utility. The mapped diagnosis code was Bipolar 2 disorder, 296.89, added by Connie Hutchison, with responsible provider Connie FOREMAN. Onset date 03/10/2014; last addressed on 2014.    Drug abuse 02/27/2012 N  Mapped from [facility] Chronic Conditions table on 06/22/2014 by the ICD9 to SNOMED Bulk Mapping Utility. The mapped diagnosis code was History of drug abuse, 305.93, added by Robb Alba, with responsible provider Robb Alba PA-C. Onset date 02/27/2012. Headache 06/18/2014 Y  Mapped from [facility] Chronic Conditions table on 06/21/2014 by the ICD9 to SNOMED Bulk Mapping Utility. The mapped diagnosis code was Headache, 784.0, added by Robb Alba, with responsible provider Robb FOREMAN. Onset date 06/18/2014; last addressed on 06/18/2014. History of drug abuse 01/08/2021 Y     Depression 01/08/2021 Y  Mapped from [facility] Chronic Conditions table on 01/08/2021 by Julio Paige. The mapped diagnosis code was Anxiety,300.00, added by KEVEN Navarrete , with responsible provider Fe Pimentel MD.  Onset date 08/12/2011; last addressed on 09/27/2012. Hepatitis C antibody test positive 02/02/2017 Y           Medications (active prior to today)  Medication Name Sig Description Start Date Stop Date Refilled Rx Elsewhere   buprenorphine HCl 8 mg sublingual tablet place 1 tablet by sublingual route 2 times every day allow to dissolve slowly in mouth without chewing or swallowing //   Y   Miralax 17 gram/dose oral powder take (17G)  by oral route  every day mixed with 8 oz. water, juice, soda, coffee or tea 06/21/2018 06/21/2018 N   doxepin 10 mg capsule take up to 3 tablets by mouth at bedtime //  06/21/2018 Y   docusate sodium 100 mg capsule take 1 capsule by oral route  every day at bedtime as needed //   Y   Pristiq 25 mg tablet,extended release take 1 tablet by oral route  every day at approximately the same time each day 02/09/2021   N   aspirin 81 mg chewable tablet  //   Y   cyclobenzaprine 5 mg tablet  //   Y     Medication Reconciliation  Medications reconciled today.   Medication Reviewed  Adherence Medication Name Sig Desc Elsewhere Status   taking as directed buprenorphine HCl 8 mg sublingual tablet place 1 tablet by sublingual route 2 times every day allow to dissolve slowly in mouth without chewing or swallowing Y Verified   taking as directed doxepin 10 mg capsule take up to 3 tablets by mouth at bedtime Y Verified   taking as directed Pristiq 25 mg tablet,extended release take 1 tablet by oral route  every day at approximately the same time each day N Verified   taking as directed docusate sodium 100 mg capsule take 1 capsule by oral route  every day at bedtime as needed Y Verified   taking as directed Miralax 17 gram/dose oral powder take (17G)  by oral route  every day mixed with 8 oz. water, juice, soda, coffee or tea N Verified   taking as directed aspirin 81 mg chewable tablet  Y Verified   taking as directed cyclobenzaprine 5 mg tablet  Y Verified     Allergies:  Ingredient Reaction (Severity) Medication Name Comment   LITHIUM did not tolerate     METHOCARBAMOL chest pain/throat swells Robaxin        Family History  (Detailed)  Relationship Family Member Name  Age at Death Condition Onset Age Cause of Death       Family history of Diabetes mellitus  N   Father    Cancer, lung 76 Y   Maternal aunt    Cancer, cervical  N     Social History:  (Detailed)  Tobacco use reviewed. Preferred language is [place]. MARITAL STATUS/FAMILY/SOCIAL SUPPORT  Marital status: Single   Dad and sister, one of her friends from [address] [facility] [address] on H last [place]  Tobacco use status: Ex-cigarette smoker. Smoking status: Former smoker.     SMOKING STATUS  Type Smoking Status Usage Per Day Years Used Pack Years Total Pack Years   Cigarette Former smoker 5 Cigarettes        TOBACCO CESSATION INFORMATION  Date Counseled By Order Status Description Code Tobacco Cessation Information   2014 Chalo Burch Tobacco cessation counseling completed   Smoking effects education   2016 Citlaly Heller Tobacco cessation counseling completed   Smoking effects education   2016 Rafael Cason Tobacco cessation counseling completed   Tobacco cessation counseling   06/21/2018 Jodie Browning Tobacco cessation counseling completed   Smoking cessation education   08/08/2018 Jodie Browning Tobacco cessation counseling completed   Smoking effects education   08/07/2020 Traci Munoz Tobacco cessation counseling completed   Smoking cessation education   01/08/2021 Dominick Valdez CMA Tobacco cessation counseling completed   Smoking cessation education   03/02/2021 DEBI Rosales Tobacco cessation counseling completed   Smoking cessation education     VAPING USE  Screened for vaping? Yes  Status: Current user  Device type: Rechargeable  Frequency: Daily  Current strength: 0.3% (3 mg/mL)  Vaping reason: Quit smoking  Vaping cessation discussed: Yes    ALCOHOL  There is no history of alcohol use. CAFFEINE  The patient does not use caffeine. LIFESTYLE  DIET  none. Review of Systems  System Neg/Pos Details   Constitutional Negative Chills, Fever and Night sweats. Respiratory Negative Dyspnea. Cardio Negative Edema and Irregular heartbeat/palpitations. GI Negative Constipation, Diarrhea, Nausea and Vomiting.  Negative Dysuria and Hematuria. Neuro Negative Dizziness and Headache. Psych Negative Anxiety and Depression. MS Negative Back pain. Hema/Lymph Negative Easy bleeding and Easy bruising. Reproductive Positive History of abnormal PAP smear, Menarche age (Menarche age was 17), Menses (Frequency: every 28 days. Last menses was 03/09/2021. The flow is normal), The patient is pre-menopausal.   Reproductive Negative Breast discharge, Breast lumps, Breast pain, Dysmenorrhea, Menorrhagia, Vaginal discharge and Vaginal itching. Reproductive Comments S/p tubal ligation. Vital Signs     Last menses was 03/09/2021.    Time BP mm/Hg Pulse /min Resp /min Temp F Ht ft Ht in Ht cm Wt lb Wt kg BMI kg/m2 BSA m2 O2 Sat%   10:09 /72 62 14 96.9 5.0 5.00 165.10 121.20 54.975 20.17 1.59 93 Measured By  Time Measured by   10:09 AM Kin Muñoz MA     Physical Exam  Exam Findings Details   Constitutional Normal Well developed. Neck Exam Normal Palpation - Normal.   Respiratory Normal Inspection - Normal. Auscultation - Normal.   Cardiovascular Normal Heart rate - Regular rate. Rhythm - Regular. Extremities - No edema. Abdomen Normal Anterior palpation -  No Guarding,No rebound. No abdominal tenderness. Genitourinary * Pap not performed   Genitourinary Normal Urethral meatus - Normal. Urethra - Normal. External genitalia - Normal. Glands - Normal. Perineum - Normal. Anus - Normal. Vagina - Normal. Cervix - Normal. Uterus - Normal. Adnexa - Normal. Bladder - Normal. Adequate sized pelvis. No suprapubic tenderness. No CVA tenderness. No flank mass. No vaginal discharge. Skin Normal Inspection - Normal.   Extremity Normal No edema. Psychiatric Normal Orientation - Oriented to time, place, person & situation. Appropriate mood and affect. Pap Detail:    Last menses was 03/09/2021. Patient is premenopausal.   Completed Orders (This Visit)  Order Details Reason Side Interpretation Result Initial Treatment Date Region   Patient Health Questionnaire (PHQ-2)    Further testing is not required 0     CAGE-AID Alcohol and Drug Screening    None 0     SDOH    Intervention not needed 0     21-29 Pap IG, rfx HPV ASCU    see lab module        Assessment/Plan  # Detail Type Description    1. Assessment Severe dysplasia of cervix (D06.9). Patient Plan Risks/benefit/alternatives/complications to procedure d/w pt. Questions answered and consents obtained. Pt. desires to proceed w/ Loop electroexcision procedure. Provider Plan 3/3/21 [place] w/ ECC - HSIL& Bx  @ 6 oclock- SIMBA II, needs LEEP - [place] 3/8/21  2/2/21 HGSIL pap, +HR HVP +32+61, needs colpo KK  7/38/55 Certified letter [facility]  5/13/16 [place] w/ ECC -neg;  Bx - SIMBA I, pap in 1 yr., HENNA, DO 5/17/16 4/20/16 LSIL PAP, + HR HPV, NEEDS COLP, HENNA, DO 4/25/16 2. Assessment Body mass index (BMI) 20.0-20.9, adult (Z68.20). Diagnostic History Entered Today  Performed Study Interpretation Result   02/02/2021 21-29 Pap IG, rfx HPV ASCU see lab module      Medications (Added, Continued or Stopped today):  Start Date Medication Directions PRN Status PRN Reason Instruction Stop Date    aspirin 81 mg chewable tablet  N       buprenorphine HCl 8 mg sublingual tablet place 1 tablet by sublingual route 2 times every day allow to dissolve slowly in mouth without chewing or swallowing N       cyclobenzaprine 5 mg tablet  N       docusate sodium 100 mg capsule take 1 capsule by oral route  every day at bedtime as needed N       doxepin 10 mg capsule take up to 3 tablets by mouth at bedtime N      06/21/2018 Miralax 17 gram/dose oral powder take (17G)  by oral route  every day mixed with 8 oz. water, juice, soda, coffee or tea N      02/09/2021 Pristiq 25 mg tablet,extended release take 1 tablet by oral route  every day at approximately the same time each day N  **replace cymbalta          The patient was checked out at 10:23 AM by Rachelle Corona. Active Patient Care Team Members    Name Contact Agency Type Support Role Relationship Active Date Inactive Date Specialty   Naveen Gay CNP   Patient provider PCP   Nurse Practitioner NP       Provider:   Maico Vargas  03/31/2021 11:00 AM   Document generated by:   Maykel Koch 03/31/2021 11:00 AM  [facility]  [address]   Phone: [phone]  Fax: [phone]      Electronically signed by Maykel Koch DO on 03/31/2021 11:00 AM

## 2021-04-01 RX ORDER — LORATADINE 10 MG
TABLET ORAL
Qty: 60 TABLET | Refills: 0 | Status: ON HOLD | OUTPATIENT
Start: 2021-04-01 | End: 2021-04-06 | Stop reason: HOSPADM

## 2021-04-02 NOTE — PROGRESS NOTES
Patient instructed to:  Bring picture ID, insurance card,proof of address  Dress in comfortable,loose clothing,no jewelry, no make up/or finger polish/nocontact lenses dos if appplicable  ALL Lien Gallina on operative limb must be removed prior to DOS -if applicable  Nothing to eat or drink after midnight the night before surgery   If instructed to take medicines day of surgery by PCP, take only with sips of water  If you are taking insulin or diabetic medications check with your PCP to adjust doses according to your NPO (not eating) status  Arrange for transportation to and from surgery  With a caregiver staying with you for 24 hours  --make sure you are bring appropriate clothes to fit over large operative drsg - if applicable  Bring copies of H&P and or ekg dos     If your are taking NSAIDS/ASA products/vitamins regularly confirm this with your surgeon regarding taking them preop 5 days before surgery     Notify your Surgeon if you develop any illness between now and surgery time; cough, cold, fever, sore throat, nausea, vomiting, etc.

## 2021-04-02 NOTE — PROGRESS NOTES
Obstructive Sleep Apnea (ZANDER) Screening     Patient:  Isra Soria    YOB: 1980      Medical Record #:  [id number]                     Date:  4/2/2021     1. Are you a loud and/or regular snorer? []  Yes       [x] No    2. Have you been observed to gasp or stop breathing during sleep? []  Yes       [x] No    3. Do you feel tired or groggy upon awakening or do you awaken with a headache?           []  Yes       [x] No    4. Are you often tired or fatigued during the wake time hours? []  Yes       [x] No    5. Do you fall asleep sitting, reading, watching TV or driving? []  Yes       [x] No    6. Do you often have problems with memory or concentration? []  Yes       [x] No    **If patient's score is ? 3 they are considered high risk for ZANDER. An Anesthesia provider will evaluate the patient and develop a plan of care the day of surgery. Note:  If the patient's BMI is more than 35 kg m¯² , has neck circumference > 40 cm, and/or high blood pressure the risk is greater (© American Sleep Apnea Association, 2006).

## 2021-04-05 ENCOUNTER — ANESTHESIA EVENT (OUTPATIENT)
Dept: OPERATING ROOM | Age: 41
End: 2021-04-05
Payer: COMMERCIAL

## 2021-04-06 ENCOUNTER — HOSPITAL ENCOUNTER (OUTPATIENT)
Age: 41
Setting detail: OUTPATIENT SURGERY
Discharge: HOME | End: 2021-04-06
Attending: OBSTETRICS & GYNECOLOGY | Admitting: OBSTETRICS & GYNECOLOGY
Payer: COMMERCIAL

## 2021-04-06 ENCOUNTER — ANESTHESIA (OUTPATIENT)
Dept: OPERATING ROOM | Age: 41
End: 2021-04-06
Payer: COMMERCIAL

## 2021-04-06 VITALS
HEART RATE: 76 BPM | WEIGHT: 120 LBS | HEIGHT: 64 IN | TEMPERATURE: 97.8 F | OXYGEN SATURATION: 100 % | BODY MASS INDEX: 20.49 KG/M2 | SYSTOLIC BLOOD PRESSURE: 108 MMHG | DIASTOLIC BLOOD PRESSURE: 55 MMHG | RESPIRATION RATE: 11 BRPM

## 2021-04-06 VITALS
DIASTOLIC BLOOD PRESSURE: 39 MMHG | RESPIRATION RATE: 20 BRPM | SYSTOLIC BLOOD PRESSURE: 87 MMHG | OXYGEN SATURATION: 99 %

## 2021-04-06 DIAGNOSIS — B97.7 HPV (HUMAN PAPILLOMA VIRUS) INFECTION: ICD-10-CM

## 2021-04-06 DIAGNOSIS — R87.613 PAPANICOLAOU SMEAR OF CERVIX WITH HIGH GRADE SQUAMOUS INTRAEPITHELIAL LESION (HGSIL): ICD-10-CM

## 2021-04-06 PROBLEM — Z98.890 S/P LEEP: Status: ACTIVE | Noted: 2021-04-06

## 2021-04-06 LAB
HCT VFR BLD CALC: 34.2 % (ref 36–48)
HEMOGLOBIN: 11.7 G/DL (ref 12–16)
MCH RBC QN AUTO: 28.8 PG (ref 26–34)
MCHC RBC AUTO-ENTMCNC: 34.3 G/DL (ref 31–36)
MCV RBC AUTO: 83.9 FL (ref 80–100)
PDW BLD-RTO: 12.7 % (ref 12.4–15.4)
PLATELET # BLD: 223 K/UL (ref 135–450)
PMV BLD AUTO: 7.6 FL (ref 5–10.5)
PREGNANCY, URINE: NEGATIVE
RBC # BLD: 4.07 M/UL (ref 4–5.2)
WBC # BLD: 4.3 K/UL (ref 4–11)

## 2021-04-06 PROCEDURE — 3700000001 HC ADD 15 MINUTES (ANESTHESIA): Performed by: OBSTETRICS & GYNECOLOGY

## 2021-04-06 PROCEDURE — 7100000001 HC PACU RECOVERY - ADDTL 15 MIN: Performed by: OBSTETRICS & GYNECOLOGY

## 2021-04-06 PROCEDURE — 3700000000 HC ANESTHESIA ATTENDED CARE: Performed by: OBSTETRICS & GYNECOLOGY

## 2021-04-06 PROCEDURE — 2500000003 HC RX 250 WO HCPCS: Performed by: OBSTETRICS & GYNECOLOGY

## 2021-04-06 PROCEDURE — 84703 CHORIONIC GONADOTROPIN ASSAY: CPT

## 2021-04-06 PROCEDURE — 88305 TISSUE EXAM BY PATHOLOGIST: CPT

## 2021-04-06 PROCEDURE — 2709999900 HC NON-CHARGEABLE SUPPLY: Performed by: OBSTETRICS & GYNECOLOGY

## 2021-04-06 PROCEDURE — 7100000010 HC PHASE II RECOVERY - FIRST 15 MIN: Performed by: OBSTETRICS & GYNECOLOGY

## 2021-04-06 PROCEDURE — 7100000011 HC PHASE II RECOVERY - ADDTL 15 MIN: Performed by: OBSTETRICS & GYNECOLOGY

## 2021-04-06 PROCEDURE — 7100000000 HC PACU RECOVERY - FIRST 15 MIN: Performed by: OBSTETRICS & GYNECOLOGY

## 2021-04-06 PROCEDURE — 85027 COMPLETE CBC AUTOMATED: CPT

## 2021-04-06 PROCEDURE — 3600000012 HC SURGERY LEVEL 2 ADDTL 15MIN: Performed by: OBSTETRICS & GYNECOLOGY

## 2021-04-06 PROCEDURE — 6360000002 HC RX W HCPCS: Performed by: ANESTHESIOLOGY

## 2021-04-06 PROCEDURE — 2580000003 HC RX 258: Performed by: ANESTHESIOLOGY

## 2021-04-06 PROCEDURE — 6360000002 HC RX W HCPCS: Performed by: NURSE ANESTHETIST, CERTIFIED REGISTERED

## 2021-04-06 PROCEDURE — 2580000003 HC RX 258: Performed by: OBSTETRICS & GYNECOLOGY

## 2021-04-06 PROCEDURE — 3600000002 HC SURGERY LEVEL 2 BASE: Performed by: OBSTETRICS & GYNECOLOGY

## 2021-04-06 RX ORDER — ONDANSETRON 2 MG/ML
INJECTION INTRAMUSCULAR; INTRAVENOUS PRN
Status: DISCONTINUED | OUTPATIENT
Start: 2021-04-06 | End: 2021-04-06 | Stop reason: SDUPTHER

## 2021-04-06 RX ORDER — MIDAZOLAM HYDROCHLORIDE 1 MG/ML
INJECTION INTRAMUSCULAR; INTRAVENOUS PRN
Status: DISCONTINUED | OUTPATIENT
Start: 2021-04-06 | End: 2021-04-06 | Stop reason: SDUPTHER

## 2021-04-06 RX ORDER — SODIUM CHLORIDE 0.9 % (FLUSH) 0.9 %
10 SYRINGE (ML) INJECTION PRN
Status: DISCONTINUED | OUTPATIENT
Start: 2021-04-06 | End: 2021-04-06 | Stop reason: HOSPADM

## 2021-04-06 RX ORDER — PROPOFOL 10 MG/ML
INJECTION, EMULSION INTRAVENOUS PRN
Status: DISCONTINUED | OUTPATIENT
Start: 2021-04-06 | End: 2021-04-06 | Stop reason: SDUPTHER

## 2021-04-06 RX ORDER — LIDOCAINE HYDROCHLORIDE AND EPINEPHRINE 10; 10 MG/ML; UG/ML
INJECTION, SOLUTION INFILTRATION; PERINEURAL PRN
Status: DISCONTINUED | OUTPATIENT
Start: 2021-04-06 | End: 2021-04-06 | Stop reason: ALTCHOICE

## 2021-04-06 RX ORDER — MORPHINE SULFATE 2 MG/ML
1 INJECTION, SOLUTION INTRAMUSCULAR; INTRAVENOUS EVERY 5 MIN PRN
Status: DISCONTINUED | OUTPATIENT
Start: 2021-04-06 | End: 2021-04-06 | Stop reason: HOSPADM

## 2021-04-06 RX ORDER — HYDRALAZINE HYDROCHLORIDE 20 MG/ML
5 INJECTION INTRAMUSCULAR; INTRAVENOUS EVERY 10 MIN PRN
Status: DISCONTINUED | OUTPATIENT
Start: 2021-04-06 | End: 2021-04-06 | Stop reason: HOSPADM

## 2021-04-06 RX ORDER — OXYCODONE HYDROCHLORIDE AND ACETAMINOPHEN 5; 325 MG/1; MG/1
1 TABLET ORAL PRN
Status: DISCONTINUED | OUTPATIENT
Start: 2021-04-06 | End: 2021-04-06 | Stop reason: HOSPADM

## 2021-04-06 RX ORDER — MEPERIDINE HYDROCHLORIDE 50 MG/ML
12.5 INJECTION INTRAMUSCULAR; INTRAVENOUS; SUBCUTANEOUS EVERY 5 MIN PRN
Status: DISCONTINUED | OUTPATIENT
Start: 2021-04-06 | End: 2021-04-06 | Stop reason: HOSPADM

## 2021-04-06 RX ORDER — KETOROLAC TROMETHAMINE 30 MG/ML
INJECTION, SOLUTION INTRAMUSCULAR; INTRAVENOUS PRN
Status: DISCONTINUED | OUTPATIENT
Start: 2021-04-06 | End: 2021-04-06 | Stop reason: SDUPTHER

## 2021-04-06 RX ORDER — SODIUM CHLORIDE, SODIUM LACTATE, POTASSIUM CHLORIDE, CALCIUM CHLORIDE 600; 310; 30; 20 MG/100ML; MG/100ML; MG/100ML; MG/100ML
INJECTION, SOLUTION INTRAVENOUS CONTINUOUS
Status: DISCONTINUED | OUTPATIENT
Start: 2021-04-06 | End: 2021-04-06 | Stop reason: HOSPADM

## 2021-04-06 RX ORDER — LABETALOL HYDROCHLORIDE 5 MG/ML
5 INJECTION, SOLUTION INTRAVENOUS EVERY 10 MIN PRN
Status: DISCONTINUED | OUTPATIENT
Start: 2021-04-06 | End: 2021-04-06 | Stop reason: HOSPADM

## 2021-04-06 RX ORDER — OXYCODONE HYDROCHLORIDE AND ACETAMINOPHEN 5; 325 MG/1; MG/1
2 TABLET ORAL PRN
Status: DISCONTINUED | OUTPATIENT
Start: 2021-04-06 | End: 2021-04-06 | Stop reason: HOSPADM

## 2021-04-06 RX ORDER — SODIUM CHLORIDE 0.9 % (FLUSH) 0.9 %
10 SYRINGE (ML) INJECTION EVERY 12 HOURS SCHEDULED
Status: DISCONTINUED | OUTPATIENT
Start: 2021-04-06 | End: 2021-04-06 | Stop reason: HOSPADM

## 2021-04-06 RX ORDER — PROMETHAZINE HYDROCHLORIDE 25 MG/ML
6.25 INJECTION, SOLUTION INTRAMUSCULAR; INTRAVENOUS
Status: DISCONTINUED | OUTPATIENT
Start: 2021-04-06 | End: 2021-04-06 | Stop reason: HOSPADM

## 2021-04-06 RX ORDER — MAGNESIUM HYDROXIDE 1200 MG/15ML
LIQUID ORAL CONTINUOUS PRN
Status: COMPLETED | OUTPATIENT
Start: 2021-04-06 | End: 2021-04-06

## 2021-04-06 RX ORDER — LIDOCAINE HYDROCHLORIDE 10 MG/ML
0.3 INJECTION, SOLUTION EPIDURAL; INFILTRATION; INTRACAUDAL; PERINEURAL
Status: DISCONTINUED | OUTPATIENT
Start: 2021-04-06 | End: 2021-04-06 | Stop reason: HOSPADM

## 2021-04-06 RX ORDER — DEXAMETHASONE SODIUM PHOSPHATE 10 MG/ML
INJECTION INTRAMUSCULAR; INTRAVENOUS PRN
Status: DISCONTINUED | OUTPATIENT
Start: 2021-04-06 | End: 2021-04-06 | Stop reason: SDUPTHER

## 2021-04-06 RX ORDER — ONDANSETRON 2 MG/ML
4 INJECTION INTRAMUSCULAR; INTRAVENOUS PRN
Status: DISCONTINUED | OUTPATIENT
Start: 2021-04-06 | End: 2021-04-06 | Stop reason: HOSPADM

## 2021-04-06 RX ORDER — IBUPROFEN 800 MG/1
800 TABLET ORAL EVERY 8 HOURS PRN
Qty: 9 TABLET | Refills: 0 | Status: SHIPPED | OUTPATIENT
Start: 2021-04-06

## 2021-04-06 RX ORDER — MORPHINE SULFATE 2 MG/ML
2 INJECTION, SOLUTION INTRAMUSCULAR; INTRAVENOUS EVERY 5 MIN PRN
Status: DISCONTINUED | OUTPATIENT
Start: 2021-04-06 | End: 2021-04-06 | Stop reason: HOSPADM

## 2021-04-06 RX ORDER — DIPHENHYDRAMINE HYDROCHLORIDE 50 MG/ML
12.5 INJECTION INTRAMUSCULAR; INTRAVENOUS
Status: DISCONTINUED | OUTPATIENT
Start: 2021-04-06 | End: 2021-04-06 | Stop reason: HOSPADM

## 2021-04-06 RX ADMIN — ONDANSETRON 4 MG: 2 INJECTION INTRAMUSCULAR; INTRAVENOUS at 13:35

## 2021-04-06 RX ADMIN — SODIUM CHLORIDE, POTASSIUM CHLORIDE, SODIUM LACTATE AND CALCIUM CHLORIDE: 600; 310; 30; 20 INJECTION, SOLUTION INTRAVENOUS at 12:14

## 2021-04-06 RX ADMIN — MIDAZOLAM HYDROCHLORIDE 2 MG: 2 INJECTION, SOLUTION INTRAMUSCULAR; INTRAVENOUS at 12:44

## 2021-04-06 RX ADMIN — KETOROLAC TROMETHAMINE 30 MG: 30 INJECTION, SOLUTION INTRAMUSCULAR; INTRAVENOUS at 13:15

## 2021-04-06 RX ADMIN — DEXAMETHASONE SODIUM PHOSPHATE 6 MG: 10 INJECTION INTRAMUSCULAR; INTRAVENOUS at 12:50

## 2021-04-06 RX ADMIN — SODIUM CHLORIDE, SODIUM LACTATE, POTASSIUM CHLORIDE, AND CALCIUM CHLORIDE: .6; .31; .03; .02 INJECTION, SOLUTION INTRAVENOUS at 12:44

## 2021-04-06 RX ADMIN — ONDANSETRON 4 MG: 2 INJECTION INTRAMUSCULAR; INTRAVENOUS at 12:50

## 2021-04-06 RX ADMIN — PROPOFOL 200 MG: 10 INJECTION, EMULSION INTRAVENOUS at 12:46

## 2021-04-06 ASSESSMENT — PULMONARY FUNCTION TESTS
PIF_VALUE: 13
PIF_VALUE: 16
PIF_VALUE: 1
PIF_VALUE: 12
PIF_VALUE: 12
PIF_VALUE: 17
PIF_VALUE: 12
PIF_VALUE: 12
PIF_VALUE: 20
PIF_VALUE: 12
PIF_VALUE: 19
PIF_VALUE: 2
PIF_VALUE: 18
PIF_VALUE: 19

## 2021-04-06 NOTE — PROCEDURES
Loop Electroexcision Procedure (LEEP) Note    Indications: severe dysplasia, vaginal mass  Pre-operative Diagnosis: severe dysplasia, vaginal mass  Post-operative Diagnosis: severe dysplasia, vaginal mass    Operation: LEEP, vaginal mass removal    Surgeon: Claudeen Gosling     Anesthesia: LMA      Procedure Details   The patient was seen in the Holding Room. The risks, benefits, complications, treatment options, and expected outcomes were discussed with the patient. The patient concurred with the proposed plan, giving informed consent. The site of surgery properly noted/marked. The patient was taken to Operating Room, identified as   Madelyn Suh.   and the procedure verified as LEEP for severe dysplasia. A Time Out was held and the above information confirmed. Patient was placed in the dorsal lithotomy position after anesthesia and draped and prepped in the usual sterile manner. A catheter was used to empty the bladder. Inspection of the vagina revealed redundant vaginal tissue, posterior vagina at 6 o'clock. A speculum was placed in the patient's vagina. A local injection of 4 ml of Lidocaine - 1% was injected to the cervix at the 2, 6, 10 o'clock positions. A 2.0 x 1.2 cm loop was used to excise the cervix from the 12 to 6 o'clock position. one stitch was placed at the 12 o'clock position and two stitches were placed at the 6 o'clock position of the specimen. The specimen was sent to pathology. A roller ball was used for cauterization. The speculum was removed. Once again, inspection of the vagina revealed redundant posterior vaginal tissue, at 6 o'clock of the vagina. 3 ml of lidocaine - 1% was injected below the tissue. The tissue was removed with a scalpel. Three interrupted stitches were placed and hemostasis was noted. Instrument, sponge, and needle counts were correct times two.      Estimated Blood Loss: Less than 20 ml             Specimens: Cervical conization Complications:  None; patient tolerated the procedure well. Disposition: PACU - hemodynamically stable. Condition: stable    Attending Attestation: I was present and scrubbed for the entire procedure.

## 2021-04-06 NOTE — H&P
Update History & Physical    The patient's History and Physical of March 31, 2021 was reviewed with the patient and I examined the patient. There was no change. The surgical site was confirmed by the patient and me. Plan: The risks, benefits, expected outcome, and alternative to the recommended procedure have been discussed with the patient. Patient understands and wants to proceed with the procedure.      Electronically signed by Tamela Calderón DO on 4/6/2021 at 12:35 PM

## 2021-04-06 NOTE — ANESTHESIA PRE PROCEDURE
Department of Anesthesiology  Preprocedure Note       Name:  Airam Cardenas   Age:  47 y.o.  :  1980                                          MRN:  [id number]         Date:  2021      Surgeon: Ese Jacobson):  Davi Mistry DO    Procedure: Procedure(s):  LOOP ELECTROSURGICAL EXCISION PROCEDURE    Medications prior to admission:   Prior to Admission medications    Medication Sig Start Date End Date Taking?  Authorizing Provider   [employer] ASPIRIN ADULT LOW DOSE 81 MG chewable tablet TAKE 1 TABLET BY MOUTH 2 TIMES DAILY 21   Marysol Chamberlain MD   cyclobenzaprine (FLEXERIL) 5 MG tablet TAKE 1 TABLET BY MOUTH 2 TIMES DAILY AS NEEDED FOR MUSCLE SPASMS 3/30/21 4/9/21  Bro Smith PA-C   doxepin (SINEQUAN) 10 MG capsule Take 10 mg by mouth nightly    Historical Provider, MD   docusate sodium (COLACE) 100 MG capsule Take 100 mg by mouth 2 times daily as needed for Constipation    Historical Provider, MD   polyethylene glycol (GLYCOLAX) 17 g packet Take 17 g by mouth daily as needed for Constipation    Historical Provider, MD   ondansetron (ZOFRAN) 4 MG tablet Take 1 tablet by mouth every 8 hours as needed for Nausea or Vomiting 3/10/21   Marysol Chamberlain MD   Buprenorphine HCl-Naloxone HCl (SUBOXONE SL) Place 15 mg under the tongue    Historical Provider, MD   desvenlafaxine succinate (PRISTIQ) 25 MG TB24 extended release tablet TAKE 1 TABLET BY MOUTH DAILY AT APPROXIMATELY THE SAME TIME EVERY DAY *REPLACES CYMBALTA 2/10/21   Historical Provider, MD       Current medications:    Current Facility-Administered Medications   Medication Dose Route Frequency Provider Last Rate Last Admin    lactated ringers infusion   Intravenous Continuous Yamileth Gibson MD        sodium chloride flush 0.9 % injection 10 mL  10 mL Intravenous 2 times per day Yamileth Gibson MD        sodium chloride flush 0.9 % injection 10 mL  10 mL Intravenous PRN Yamileth Gibson MD        lidocaine PF 1 % injection 0.3 mL  0.3 mL Intradermal Once PRN Brodie Webb MD        lactated ringers infusion   Intravenous Continuous Freedom Serrano DO        sodium chloride flush 0.9 % injection 10 mL  10 mL Intravenous 2 times per day Freedom Serrano DO        sodium chloride flush 0.9 % injection 10 mL  10 mL Intravenous PRN Freedom Serrano DO           Allergies: Allergies   Allergen Reactions    Methocarbamol Shortness Of Breath and Swelling     Swelling of throat     Lithium      Cannot tolerate it        Problem List:    Patient Active Problem List   Diagnosis Code    Cellulitis L03.90    SIMBA III with severe dysplasia D06.9       Past Medical History:        Diagnosis Date    Arthritis     states yes and takes advil    Bipolar depression ([address]     per pt    Depression     Hepatitis C     states Hep C resolved    Heroin use     clean x 7 years    Migraine     MRSA (methicillin resistant staph aureus) culture positive 13    Positive right hand cx       Past Surgical History:        Procedure Laterality Date    HAND SURGERY  13    INCISION AND DRAINAGE RT HAND    SHOULDER ARTHROSCOPY Left 3/10/2021    VIDEO ARTHROSCOPY LEFT SHOULDER,  ROTATOR CUFF REPAIR, EXTENSIVE DEBRIDEMENT, DECOMPRESSION OF SUBACROMIAL SPACE WITH PARTIAL ACROMIOPLASTY, DISTAL CLAVICLE EXCISION, BICEPS TENODESIS performed by Lauro Arthur MD at [address] [place] [address] History:    Social History     Tobacco Use    Smoking status: Former Smoker     Packs/day: 1.00     Years: 1.00     Pack years: 1.00     Types: Cigarettes     Quit date: 3/3/2021     Years since quittin.0    Smokeless tobacco: Never Used   Substance Use Topics    Alcohol use:  No                                Counseling given: Not Answered      Vital Signs (Current):   Vitals:    21 1104   Weight: 120 lb (54.4 kg)   Height: 5' 4\" (1.626 m)                                              BP Readings from Last 3 Encounters:   03/10/21 (!) 108/59   03/10/21 106/64 12/02/20 138/71       NPO Status:                                                                                 BMI:   Wt Readings from Last 3 Encounters:   04/02/21 120 lb (54.4 kg)   03/30/21 117 lb (53.1 kg)   03/10/21 117 lb (53.1 kg)     Body mass index is 20.6 kg/m². CBC:   Lab Results   Component Value Date    WBC 7.1 09/05/2013    RBC 4.12 09/05/2013    HGB 11.9 09/05/2013    HCT 36.2 09/05/2013    MCV 87.9 09/05/2013    RDW 13.0 09/05/2013     09/05/2013       CMP:   Lab Results   Component Value Date     09/05/2013    K 3.6 09/05/2013     09/05/2013    CO2 29 09/05/2013    BUN 10 09/05/2013    CREATININE 0.6 09/05/2013    GFRAA >60 09/05/2013    GFRAA >60 06/10/2013    AGRATIO 1.4 09/05/2013    LABGLOM >60 09/05/2013    GLUCOSE 92 09/05/2013    PROT 7.4 09/05/2013    PROT 7.0 06/22/2012    CALCIUM 9.4 09/05/2013    BILITOT 0.23 09/05/2013    ALKPHOS 46 09/05/2013    AST 13 09/05/2013    ALT 12 09/05/2013       POC Tests: No results for input(s): POCGLU, POCNA, POCK, POCCL, POCBUN, POCHEMO, POCHCT in the last 72 hours.     Coags: No results found for: PROTIME, INR, APTT    HCG (If Applicable):   Lab Results   Component Value Date    PREGTESTUR Negative 03/10/2021        ABGs: No results found for: PHART, PO2ART, OQU6NEN, ZWK5XGV, BEART, E2QISVZP     Type & Screen (If Applicable):  No results found for: LABABO, LABRH    Drug/Infectious Status (If Applicable):  No results found for: HIV, HEPCAB    COVID-19 Screening (If Applicable):   Lab Results   Component Value Date    COVID19 Not Detected 03/31/2021    COVID19 Not Detected 03/04/2021           Anesthesia Evaluation  Patient summary reviewed no history of anesthetic complications:   Airway: Mallampati: II  TM distance: >3 FB   Neck ROM: full  Mouth opening: > = 3 FB Dental: normal exam         Pulmonary:Negative Pulmonary ROS and normal exam  breath sounds clear to auscultation                             Cardiovascular:Negative CV

## 2021-04-12 RX ORDER — CYCLOBENZAPRINE HCL 5 MG
5 TABLET ORAL 2 TIMES DAILY PRN
Qty: 20 TABLET | Refills: 0 | Status: SHIPPED | OUTPATIENT
Start: 2021-04-12 | End: 2021-05-04 | Stop reason: SDUPTHER

## 2021-05-04 RX ORDER — CYCLOBENZAPRINE HCL 5 MG
5 TABLET ORAL 2 TIMES DAILY PRN
Qty: 20 TABLET | Refills: 0 | Status: SHIPPED | OUTPATIENT
Start: 2021-05-04 | End: 2021-05-14

## 2021-05-21 ENCOUNTER — HOSPITAL ENCOUNTER (OUTPATIENT)
Dept: GENERAL RADIOLOGY | Age: 41
Discharge: HOME | End: 2021-05-21
Payer: COMMERCIAL

## 2021-05-21 ENCOUNTER — HOSPITAL ENCOUNTER (OUTPATIENT)
Age: 41
Discharge: HOME | End: 2021-05-21
Payer: COMMERCIAL

## 2021-05-21 DIAGNOSIS — M54.50 CHRONIC MIDLINE LOW BACK PAIN WITHOUT SCIATICA: ICD-10-CM

## 2021-05-21 DIAGNOSIS — G89.29 CHRONIC MIDLINE LOW BACK PAIN WITHOUT SCIATICA: ICD-10-CM

## 2021-05-21 PROCEDURE — 72220 X-RAY EXAM SACRUM TAILBONE: CPT

## 2021-05-21 PROCEDURE — 72100 X-RAY EXAM L-S SPINE 2/3 VWS: CPT

## 2021-06-29 ENCOUNTER — OFFICE VISIT (OUTPATIENT)
Dept: ORTHOPEDIC SURGERY | Age: 41
End: 2021-06-29
Payer: COMMERCIAL

## 2021-06-29 VITALS — WEIGHT: 120 LBS | BODY MASS INDEX: 20.49 KG/M2 | HEIGHT: 64 IN

## 2021-06-29 DIAGNOSIS — M25.521 RIGHT ELBOW PAIN: Primary | ICD-10-CM

## 2021-06-29 DIAGNOSIS — Z98.890 HISTORY OF REPAIR OF LEFT ROTATOR CUFF: ICD-10-CM

## 2021-06-29 DIAGNOSIS — M77.11 LATERAL EPICONDYLITIS OF RIGHT ELBOW: ICD-10-CM

## 2021-06-29 PROCEDURE — L3908 WHO COCK-UP NONMOLDE PRE OTS: HCPCS | Performed by: ORTHOPAEDIC SURGERY

## 2021-06-29 PROCEDURE — G8427 DOCREV CUR MEDS BY ELIG CLIN: HCPCS | Performed by: ORTHOPAEDIC SURGERY

## 2021-06-29 PROCEDURE — 99214 OFFICE O/P EST MOD 30 MIN: CPT | Performed by: ORTHOPAEDIC SURGERY

## 2021-06-29 PROCEDURE — G8420 CALC BMI NORM PARAMETERS: HCPCS | Performed by: ORTHOPAEDIC SURGERY

## 2021-06-29 PROCEDURE — 1036F TOBACCO NON-USER: CPT | Performed by: ORTHOPAEDIC SURGERY

## 2021-06-29 NOTE — PROGRESS NOTES
Dr Anne Soler      Date /Time 6/29/2021             1:16 PM EDT  Name Sonya Todd             1980   Location  [address] [place] [address]  MRN [id number]                Chief Complaint   Patient presents with    Elbow Pain     RIGHT ELBOW     Shoulder Pain     LEFT SHOULDER SX 3/10/21        History of Present Illness    Sonya Todd is a 47 y.o. female presents the office today for follow-up visit. Patient is status post left shoulder rotator cuff repair and biceps tenodesis. She is 3 months from surgery. Patient's main complaint at this time is stiffness. Patient is also here for a new problem. She is here with a chief complaint of right elbow pain. Patient states her elbow pain is 8 out of 10. She has had it for 2 months. Symptoms are concentrated laterally. Patient denies any acute injury or trauma.     Past Medical History  Past Medical History:   Diagnosis Date    Arthritis     states yes and takes advil    Bipolar depression ([address]     per pt    Depression     Hepatitis C     states Hep C resolved    Heroin use     clean x 7 years    Migraine     MRSA (methicillin resistant staph aureus) culture positive 5/5/13    Positive right hand cx    PONV (postoperative nausea and vomiting)      Past Surgical History:   Procedure Laterality Date    HAND SURGERY  5/5/13    INCISION AND DRAINAGE RT HAND    LEEP N/A 4/6/2021    LOOP ELECTROSURGICAL EXCISION PROCEDURE performed by Chyna Montanez DO at [place] [address] ARTHROSCOPY Left 3/10/2021    VIDEO ARTHROSCOPY LEFT SHOULDER,  ROTATOR CUFF REPAIR, EXTENSIVE DEBRIDEMENT, DECOMPRESSION OF SUBACROMIAL SPACE WITH PARTIAL ACROMIOPLASTY, DISTAL CLAVICLE EXCISION, BICEPS TENODESIS performed by Anne Soler MD at [address] History     Tobacco Use    Smoking status: Former Smoker     Packs/day: 1.00     Years: 1.00     Pack years: 1.00     Types: Cigarettes     Quit date: 3/3/2021     Years since quittin.3    Smokeless tobacco: Never Used   Substance Use Topics    Alcohol use: No      Current Outpatient Medications on File Prior to Visit   Medication Sig Dispense Refill    ibuprofen (ADVIL;MOTRIN) 800 MG tablet Take 1 tablet by mouth every 8 hours as needed for Pain Take first dosage at 7 pm on 21 9 tablet 0    doxepin (SINEQUAN) 10 MG capsule Take 10 mg by mouth nightly      docusate sodium (COLACE) 100 MG capsule Take 100 mg by mouth 2 times daily as needed for Constipation      polyethylene glycol (GLYCOLAX) 17 g packet Take 17 g by mouth daily as needed for Constipation      ondansetron (ZOFRAN) 4 MG tablet Take 1 tablet by mouth every 8 hours as needed for Nausea or Vomiting 30 tablet 1    Buprenorphine HCl-Naloxone HCl (SUBOXONE SL) Place 15 mg under the tongue      desvenlafaxine succinate (PRISTIQ) 25 MG TB24 extended release tablet TAKE 1 TABLET BY MOUTH DAILY AT APPROXIMATELY THE SAME TIME EVERY DAY *REPLACES CYMBALTA       No current facility-administered medications on file prior to visit. ASCVD 10-YEAR RISK SCORE  The ASCVD Risk score (Aleisha Rodríguez., et al., 2013) failed to calculate for the following reasons: The 2013 ASCVD risk score is only valid for ages 36 to 78     Review of Systems  10-point ROS is negative other than HPI. Physical Exam  Based off 1997 Exam Criteria  Ht 5' 4\" (1.626 m)   Wt 120 lb (54.4 kg)   BMI 20.60 kg/m²      Constitutional:       General: He is not in acute distress. Appearance: Normal appearance. Cardiovascular:      Rate and Rhythm: Normal rate and regular rhythm. Pulses: Normal pulses. Pulmonary:      Effort: Pulmonary effort is normal. No respiratory distress. Neurological:      Mental Status: He is alert and oriented to person, place, and time. Mental status is at baseline.      Musculoskeletal:  Gait:  normal    Cervical Spine / Shoulder:      RIGHT  LEFT    Cervical Spine Exam  [x] All Neg    [x] All Neg Spurling's  []  []Not tested   []  []Not tested    Terrazas's  []  []Not tested   []  []Not tested    Pain with rotation  []  []Not tested   []  []Not tested    Pain with lateral bending  []  []Not tested   []  []Not tested    Paraspinal muscle tenderness  [] Paraspinal  []Midline   [] Paraspinal  []Not tested    Sensation RIGHT  LEFT    Axillary  [x] Normal []Decreased    [x] Normal []Decreased   Musculocutaneous  [x] Normal  []Decreased   [x] Normal []Decreased   Median  [x] Normal []Decreased   [x] Normal []Decreased   Radial  [x] Normal  []Decreased   [x] Normal []Decreased   Ulnar  [x] Normal  []Decreased   [x] Normal []Decreased   Scapula       Position  [x]Nml  []low  [] lateral  [x]Nml  []low  [] lateral   Dyskinesia  []+ []Abn. Shrug   []+ []Abn. Shrug                     Winging     [x]None   []Med  []Lat   []Worse w/FE  []Med  []Lat  []Worse w/FE   Scapulothoracic Compress.    []Impr Pain  []Impr Motion  []Impr Pain []Impr Motion    Range of Motion Active Passive Active Passive   Forward Elevation 120  170    Abduction 70  100    External Rotation @ side 40  60    External Rotation @ 90 abd 80  90    Internal Rotation @ 90 abd 0  40    Internal Rotation L5  T12    End range of motion  [] Pain  [] Pain  [] Pain  [] Pain   Strength RIGHT /5 LEFT /5   Abduction 5  5    External Rotation 5  5    Internal Rotation 5  5    Provocative Signs/Tests  [] All Neg   [x] +      [] -  [] All Neg   [x] +      [] -   Rotator Cuff Signs  [x] All Neg  [] Not tested   [x] All Neg  [] Not tested    Neer  []  []Not tested   []  []Not tested    Ernesto Conception  []  []Not tested   []  []Not tested    Painful arc  []  []Not tested   []  []Not tested    Greater tuberosity tenderness  []  []Not tested   []  []Not tested    Drop arm  []  []Not tested  []  []Not tested   Superior Escape  []  []Not tested   []  []Not tested    ER Lag  []  []Not tested   []  []Not tested    Belly press  []  []Not tested   []  []Not tested    Lift-off  []  []Not tested   []  []Not tested    Bear hug  []  []Not tested   []  []Not tested    Biceps/Labral Signs  [x] All Neg  [] Not tested   [x] All Neg  [] Not tested    Wiggins's  []  []Not tested   []  []Not tested    Speed's  []  []Not tested   []  []Not tested    Dynamic Load Shift/Shear  []  []Not tested   []  []Not tested    Clicking/Popping  []  []Not tested  []  []Not tested   Bicipital groove tenderness  []  []Not tested   []  []Not tested    Selwyn  []  []Not tested   []  []Not tested    [facility] Joint Signs  [x] All Neg  [x] Not tested   [] All Neg  [] Not tested    [facility] joint tenderness  []  []Not tested   []  []Not tested    Cross-arm adduction pain  []  []Not tested   []  []Not tested    Instability Signs  [] All Neg  [] Not tested  [] All Neg  [] Not tested   General laxity (thumb/elbow)  []  []Not tested   []  []Not tested    Hyperabduction  []  []Not tested   []  []Not tested    Sulcus []Side   []ER    []Side   []ER       Anterior apprehension  []  []Not tested   []  []Not tested    Relocation  []   []Not tested  []  []Not tested     Physical exam of the right elbow demonstrates no swelling. Full range of motion. Tenderness palpation over the extensor tendons as it inserts onto the lateral epicondyle. No gross instability to either varus or valgus stress test.  Intact neurovascular exam.    Imaging  Right elbow: [address] [facility]  Radiographs: AP, lateral, and radial head views were ordered today and reviewed of the right elbow. They demonstrate no evidence of fractures or dislocations with well-maintained joint space. Assessment and Plan  Effie Petit was seen today for elbow pain and shoulder pain. Diagnoses and all orders for this visit:    Right elbow pain  -     XR ELBOW RIGHT (MIN 3 VIEWS);  Future  -     [facility] PT - [facility]  -     Cancel: Lauren Zazueta Titan Wrist Short Brace  -     Lauren Zazueta Titan Wrist Orthosis Brace    Lateral epicondylitis of right elbow  -     [facility] PT - [facility]  -     Cancel: Lauren Zazueta Titan Wrist Short Brace  -     Lauren Zazueta Titan Wrist Orthosis Brace    History of repair of left rotator cuff        Patient overall is doing well after a left shoulder rotator cuff repair. She will continue with physical therapy concentrating on range of motion. We will follow up with us in 3 months. In terms of the right elbow she will point into a wrist extension brace. Physical therapy and anti-inflammatory medication. We will see her back at the same time in approximately 3 months. If not doing better at that time consideration for cortisone injection. I discussed with Marylu Fltecher that her history, symptoms, signs and imaging are most consistent with lateral epicondylitis and history of rotator cuff repair    We reviewed the natural history of these conditions and treatment options ranging from conservative measures (rest, icing, activity modification, physical therapy, pain meds, cortisone injection) to surgical options. In terms of treatment, I recommended continuing with rest, icing, avoidance of painful activities, NSAIDs or pain meds as tolerated, and physical therapy. If these are not effective, cortisone injection can be considered. We discussed surgical options as well, should conservative measures fail. Procedures    Lauren Zazueta Titan Wrist Orthosis Brace     Patient was prescribed a Lauren Zazueta Titan Wrist Brace. The right hand will require stabilization / immobilization from this semi-rigid / rigid orthosis to improve their function. The orthosis will assist in protecting the affected area, provide functional support and facilitate healing. The patient was educated and fit by a healthcare professional with expert knowledge and specialization in brace application while under the direct supervision of the physician. Verbal and written instructions for the use of and application of this item were provided. They were instructed to contact the office immediately should the brace result in increased pain, decreased sensation, increased swelling or worsening of the condition. Electronically signed by Ranjit Byrd MD on 6/29/2021 at 1:16 PM  This dictation was generated by voice recognition computer software. Although all attempts are made to edit the dictation for accuracy, there may be errors in the transcription that are not intended.

## 2021-07-08 RX ORDER — MELOXICAM 15 MG/1
15 TABLET ORAL DAILY
Qty: 90 TABLET | Refills: 1 | Status: SHIPPED | OUTPATIENT
Start: 2021-07-08 | End: 2021-12-29

## 2021-07-22 ENCOUNTER — OFFICE VISIT (OUTPATIENT)
Dept: ORTHOPEDIC SURGERY | Age: 41
End: 2021-07-22
Payer: COMMERCIAL

## 2021-07-22 VITALS — WEIGHT: 120 LBS | BODY MASS INDEX: 20.49 KG/M2 | HEIGHT: 64 IN

## 2021-07-22 DIAGNOSIS — Z98.890 HISTORY OF REPAIR OF LEFT ROTATOR CUFF: Primary | ICD-10-CM

## 2021-07-22 PROCEDURE — 20610 DRAIN/INJ JOINT/BURSA W/O US: CPT | Performed by: ORTHOPAEDIC SURGERY

## 2021-07-22 PROCEDURE — 99213 OFFICE O/P EST LOW 20 MIN: CPT | Performed by: ORTHOPAEDIC SURGERY

## 2021-07-22 PROCEDURE — G8420 CALC BMI NORM PARAMETERS: HCPCS | Performed by: ORTHOPAEDIC SURGERY

## 2021-07-22 PROCEDURE — G8427 DOCREV CUR MEDS BY ELIG CLIN: HCPCS | Performed by: ORTHOPAEDIC SURGERY

## 2021-07-22 PROCEDURE — 1036F TOBACCO NON-USER: CPT | Performed by: ORTHOPAEDIC SURGERY

## 2021-07-22 RX ORDER — LIDOCAINE HYDROCHLORIDE 10 MG/ML
20 INJECTION, SOLUTION INFILTRATION; PERINEURAL ONCE
Status: COMPLETED | OUTPATIENT
Start: 2021-07-22 | End: 2021-07-22

## 2021-07-22 RX ORDER — BUPIVACAINE HYDROCHLORIDE 2.5 MG/ML
30 INJECTION, SOLUTION INFILTRATION; PERINEURAL ONCE
Status: COMPLETED | OUTPATIENT
Start: 2021-07-22 | End: 2021-07-22

## 2021-07-22 RX ADMIN — LIDOCAINE HYDROCHLORIDE 20 ML: 10 INJECTION, SOLUTION INFILTRATION; PERINEURAL at 15:45

## 2021-07-22 RX ADMIN — BUPIVACAINE HYDROCHLORIDE 75 MG: 2.5 INJECTION, SOLUTION INFILTRATION; PERINEURAL at 15:46

## 2021-07-22 NOTE — PROGRESS NOTES
Dr Deyanira Peters      Date /Time 7/22/2021             3:35 PM EDT  Name Jyotsna Brewer             1980   Location  [facility]  MRN [id number]                Chief Complaint   Patient presents with    Shoulder Pain     CK LEFT SHOULDER         History of Present Illness    Jyotsna Brewer is a 47 y.o. female who presents with  left Shoulder pain. Sent in consultation by [facility] ([facility]),       Presents to the office today for a follow-up visit. Patient is here with a chief complaint of left shoulder pain. Approximately 4 months ago she underwent a rotator cuff repair of her subscapularis and a biceps tenodesis. Approximately 10% of her rotator cuff was torn. Since then she has continued complaint of pain and swelling. Patient states that she was doing fairly well. Then over the last month she has developed increased pain and stiffness.   Past Medical History  Past Medical History:   Diagnosis Date    Arthritis     states yes and takes advil    Bipolar depression ([address]     per pt    Depression     Hepatitis C     states Hep C resolved    Heroin use     clean x 7 years    Migraine     MRSA (methicillin resistant staph aureus) culture positive 5/5/13    Positive right hand cx    PONV (postoperative nausea and vomiting)      Past Surgical History:   Procedure Laterality Date    HAND SURGERY  5/5/13    INCISION AND DRAINAGE RT HAND    LEEP N/A 4/6/2021    LOOP ELECTROSURGICAL EXCISION PROCEDURE performed by Denise Roberto DO at [place] [address] ARTHROSCOPY Left 3/10/2021    VIDEO ARTHROSCOPY LEFT SHOULDER,  ROTATOR CUFF REPAIR, EXTENSIVE DEBRIDEMENT, DECOMPRESSION OF SUBACROMIAL SPACE WITH PARTIAL ACROMIOPLASTY, DISTAL CLAVICLE EXCISION, BICEPS TENODESIS performed by Deyanira Peters MD at [address] History     Tobacco Use    Smoking status: Former Smoker     Packs/day: 1.00     Years: 1.00     Pack years: 1.00     Types: Musculoskeletal:  Gait:  normal    Cervical Spine / Shoulder:      RIGHT  LEFT    Cervical Spine Exam  [x] All Neg    [x] All Neg     Spurling's  []  []Not tested   []  []Not tested    Terrazas's  []  []Not tested   []  []Not tested    Pain with rotation  []  []Not tested   []  []Not tested    Pain with lateral bending  []  []Not tested   []  []Not tested    Paraspinal muscle tenderness  [] Paraspinal  []Midline   [] Paraspinal  []Not tested    Sensation RIGHT  LEFT    Axillary  [x] Normal []Decreased    [x] Normal []Decreased   Musculocutaneous  [x] Normal  []Decreased   [x] Normal []Decreased   Median  [x] Normal []Decreased   [x] Normal []Decreased   Radial  [x] Normal  []Decreased   [x] Normal []Decreased   Ulnar  [x] Normal  []Decreased   [x] Normal []Decreased   Scapula       Position  [x]Nml  []low  [] lateral  [x]Nml  []low  [] lateral   Dyskinesia  []+ []Abn. Shrug   []+ []Abn. Shrug                     Winging     [x]None   []Med  []Lat   []Worse w/FE  []Med  []Lat  []Worse w/FE   Scapulothoracic Compress.    []Impr Pain  []Impr Motion  []Impr Pain []Impr Motion    Range of Motion Active Passive Active Passive   Forward Elevation 170  170    Abduction 100  80    External Rotation @ side 50  20    External Rotation @ 90 abd 90  90    Internal Rotation @ 90 abd 40  10    Internal Rotation T4  T12    End range of motion  [] Pain  [] Pain  [x] Pain  [] Pain   Strength RIGHT /5 LEFT /5   Abduction 5  5    External Rotation 5  5    Internal Rotation 5  5    Provocative Signs/Tests  [] All Neg   [x] +      [] -  [] All Neg   [x] +      [] -   Rotator Cuff Signs  [x] All Neg  [] Not tested   [x] All Neg  [] Not tested    Neer  []  []Not tested   []  []Not tested    Dearl Bue  []  []Not tested   []  []Not tested    Painful arc  []  []Not tested   []  []Not tested    Greater tuberosity tenderness  []  []Not tested   []  []Not tested    Drop arm  []  []Not tested  []  []Not tested   Superior Escape  []  []Not tested []  []Not tested    ER Lag  []  []Not tested   []  []Not tested    Belly press  []  []Not tested   []  []Not tested    Lift-off  []  []Not tested   []  []Not tested    Bear hug  []  []Not tested   []  []Not tested    Biceps/Labral Signs  [x] All Neg  [] Not tested   [x] All Neg  [] Not tested    Wiggnis's  []  []Not tested   []  []Not tested    Speed's  []  []Not tested   []  []Not tested    Dynamic Load Shift/Shear  []  []Not tested   []  []Not tested    Clicking/Popping  []  []Not tested  []  []Not tested   Bicipital groove tenderness  []  []Not tested   []  []Not tested    Selwyn  []  []Not tested   []  []Not tested    [facility] Joint Signs  [x] All Neg  [] Not tested   [x] All Neg  [] Not tested    [facility] joint tenderness  []  []Not tested   []  []Not tested    Cross-arm adduction pain  []  []Not tested   []  []Not tested    Instability Signs  [] All Neg  [] Not tested  [] All Neg  [] Not tested   General laxity (thumb/elbow)  []  []Not tested   []  []Not tested    Hyperabduction  []  []Not tested   []  []Not tested    Sulcus []Side   []ER    []Side   []ER       Anterior apprehension  []  []Not tested   []  []Not tested    Relocation  []   []Not tested  []  []Not tested     Imaging  X-rays were ordered today reviewed of the left shoulder. 2 views. AP and scapular Y. They demonstrate no evidence of fractures or dislocations with well-maintained joint space. There is evidence of biceps tenodesis. Assessment and Plan  Ruth Ariza was seen today for shoulder pain. Diagnoses and all orders for this visit:    History of repair of left rotator cuff  -     XR SHOULDER LEFT (MIN 2 VIEWS); Future        Patient is suffering from stiffness after rotator cuff repair. Have given her a left shoulder intra-articular cortisone injection. She will continue to stretch and participate in her home exercise program.  We will see her back in 6-8 weeks or sooner if problems arise.     I discussed with Yohan Escalante that her history, symptoms, signs and imaging are most consistent with stiffness. We reviewed the natural history of these conditions and treatment options ranging from conservative measures (rest, icing, activity modification, physical therapy, pain meds, cortisone injection) to surgical options. In terms of treatment, I recommended continuing with rest, icing, avoidance of painful activities, NSAIDs or pain meds as tolerated, and physical therapy. Left Shoulder Cortisone Injection: Glenohumeral CPT 15121   Consent was obtained after discussion of the risks, benefits, alternatives, including, but not limited to bleeding, pain, infection, skin disruption or discoloration. Laterality was confirmed (timeout). The shoulder was prepped with alcohol.  A formulation of 2cc of 40mg/ml Kenalog, 4cc of 1% lidocaine, 4cc of 0.25% marcaine was injected into the glenohumeral joint space with a 25 gauge needle without difficulty. The site was cleaned and dressed with a band aid.  She tolerated this well and there were no complications. We discussed surgical options as well, should conservative measures fail. Electronically signed by Albino Martini MD on 7/22/2021 at 3:35 PM  This dictation was generated by voice recognition computer software. Although all attempts are made to edit the dictation for accuracy, there may be errors in the transcription that are not intended.

## 2021-09-30 ENCOUNTER — OFFICE VISIT (OUTPATIENT)
Dept: ORTHOPEDIC SURGERY | Age: 41
End: 2021-09-30
Payer: COMMERCIAL

## 2021-09-30 VITALS — WEIGHT: 120 LBS | HEIGHT: 64 IN | BODY MASS INDEX: 20.49 KG/M2

## 2021-09-30 DIAGNOSIS — S46.911A STRAIN OF RIGHT ELBOW, INITIAL ENCOUNTER: Primary | ICD-10-CM

## 2021-09-30 PROCEDURE — 1036F TOBACCO NON-USER: CPT | Performed by: ORTHOPAEDIC SURGERY

## 2021-09-30 PROCEDURE — 99214 OFFICE O/P EST MOD 30 MIN: CPT | Performed by: ORTHOPAEDIC SURGERY

## 2021-09-30 PROCEDURE — G8420 CALC BMI NORM PARAMETERS: HCPCS | Performed by: ORTHOPAEDIC SURGERY

## 2021-09-30 PROCEDURE — G8427 DOCREV CUR MEDS BY ELIG CLIN: HCPCS | Performed by: ORTHOPAEDIC SURGERY

## 2021-09-30 NOTE — PROGRESS NOTES
Dr Tio Lewis      Date /Time 9/30/2021             3:35 PM EDT  Name Bettye Bailon             1980   Location  [facility]  MRN [id number]                Chief Complaint   Patient presents with    Follow-up     Right Elbow         History of Present Illness    Bettye Bailon is a 47 y.o. female who presents with right elbow pain    She presents today for right elbow pain. At last visit we were concerned about tennis elbow. Today her symptoms appear to be a little bit different. Previous history:  Presents to the office today for a follow-up visit. Patient is here with a chief complaint of left shoulder pain. Approximately 4 months ago she underwent a rotator cuff repair of her subscapularis and a biceps tenodesis. Approximately 10% of her rotator cuff was torn. Since then she has continued complaint of pain and swelling. Patient states that she was doing fairly well. Then over the last month she has developed increased pain and stiffness.   Past Medical History  Past Medical History:   Diagnosis Date    Arthritis     states yes and takes advil    Bipolar depression ([address]     per pt    Depression     Hepatitis C     states Hep C resolved    Heroin use     clean x 7 years    Migraine     MRSA (methicillin resistant staph aureus) culture positive 5/5/13    Positive right hand cx    PONV (postoperative nausea and vomiting)      Past Surgical History:   Procedure Laterality Date    HAND SURGERY  5/5/13    INCISION AND DRAINAGE RT HAND    LEEP N/A 4/6/2021    LOOP ELECTROSURGICAL EXCISION PROCEDURE performed by Sally Soto DO at [place] [address] ARTHROSCOPY Left 3/10/2021    VIDEO ARTHROSCOPY LEFT SHOULDER,  ROTATOR CUFF REPAIR, EXTENSIVE DEBRIDEMENT, DECOMPRESSION OF SUBACROMIAL SPACE WITH PARTIAL ACROMIOPLASTY, DISTAL CLAVICLE EXCISION, BICEPS TENODESIS performed by Tio Lewis MD at [address] History     Tobacco Use    Smoking status: Former Smoker     Packs/day: 1.00     Years: 1.00     Pack years: 1.00     Types: Cigarettes     Quit date: 3/3/2021     Years since quittin.5    Smokeless tobacco: Never Used   Substance Use Topics    Alcohol use: No      Current Outpatient Medications on File Prior to Visit   Medication Sig Dispense Refill    meloxicam (MOBIC) 15 MG tablet Take 1 tablet by mouth daily 90 tablet 1    ibuprofen (ADVIL;MOTRIN) 800 MG tablet Take 1 tablet by mouth every 8 hours as needed for Pain Take first dosage at 7 pm on 21 9 tablet 0    doxepin (SINEQUAN) 10 MG capsule Take 10 mg by mouth nightly      docusate sodium (COLACE) 100 MG capsule Take 100 mg by mouth 2 times daily as needed for Constipation      polyethylene glycol (GLYCOLAX) 17 g packet Take 17 g by mouth daily as needed for Constipation      ondansetron (ZOFRAN) 4 MG tablet Take 1 tablet by mouth every 8 hours as needed for Nausea or Vomiting 30 tablet 1    Buprenorphine HCl-Naloxone HCl (SUBOXONE SL) Place 15 mg under the tongue      desvenlafaxine succinate (PRISTIQ) 25 MG TB24 extended release tablet TAKE 1 TABLET BY MOUTH DAILY AT APPROXIMATELY THE SAME TIME EVERY DAY *REPLACES CYMBALTA       No current facility-administered medications on file prior to visit. ASCVD 10-YEAR RISK SCORE  The ASCVD Risk score (Wees[place] Math., et al., 2013) failed to calculate for the following reasons: The 2013 ASCVD risk score is only valid for ages 36 to 78     Review of Systems  10-point ROS is negative other than HPI. Physical Exam  Based off 1997 Exam Criteria  Ht 5' 4\" (1.626 m)   Wt 120 lb (54.4 kg)   BMI 20.60 kg/m²      Constitutional:       General: He is not in acute distress. Appearance: Normal appearance. Cardiovascular:      Rate and Rhythm: Normal rate and regular rhythm. Pulses: Normal pulses. Pulmonary:      Effort: Pulmonary effort is normal. No respiratory distress.    Neurological:      Mental Status: He is alert and oriented to person, place, and time. Mental status is at baseline. Musculoskeletal:  Gait:  normal    Right elbow:  Point tenderness directly over the joint capsule laterally, no tenderness over the radial head or the lateral epicondyle. Really pain along the joint itself. No block to motion whatsoever, 0 to 140 degrees without pain. Full prosupination pain-free. No crepitus felt. No swelling. No wounds. Imaging  X-rays were ordered today reviewed of the right elbow. No evidence of fracture or dislocation. No fat pad sign indicating occult fracture or injury. Slight asymmetry over the distal lateral aspect of the distal humeral cortex, unclear whether its organized periosteal reaction. Assessment and Plan  Artie Fischer was seen today for follow-up. Diagnoses and all orders for this visit:    Strain of right elbow, initial encounter          I discussed with Efrain Burgoss that her history, symptoms, signs and imaging are most consistent with right elbow strain of unknown origin. We reviewed the natural history of these conditions and treatment options ranging from conservative measures (rest, icing, activity modification, physical therapy, pain meds, cortisone injection) to surgical options. In terms of treatment, I recommended continuing with rest, icing, avoidance of painful activities, NSAIDs or pain meds as tolerated, and home therapy. I recommend getting an MRI of the right elbow. She has had previous therapy already addressing tennis elbow which has not helped. I am afraid this might be a different diagnosis. There is also periosteal reaction at the distal lateral edge of the humerus. Although there is no fluid collection within the elbow joint itself on x-ray or clinical exam, I am afraid there may be something else were missing clinically.   I believe the MRI would be useful in delineating the bony structure as well as the cartilaginous and ligamentous structures of the elbow.    Electronically signed by Deyanira Peters MD on 9/30/2021 at 2:17 PM  This dictation was generated by voice recognition computer software. Although all attempts are made to edit the dictation for accuracy, there may be errors in the transcription that are not intended.

## 2021-10-08 ENCOUNTER — TELEPHONE (OUTPATIENT)
Dept: ORTHOPEDIC SURGERY | Age: 41
End: 2021-10-08

## 2021-10-08 NOTE — TELEPHONE ENCOUNTER
MRI RIGHT ELBOW approved Auth# [id number] - HANNA / umesh       Patient Notified / Ron Ace     Follow up in office after MRI    Jm

## 2021-10-21 ENCOUNTER — OFFICE VISIT (OUTPATIENT)
Dept: ORTHOPEDIC SURGERY | Age: 41
End: 2021-10-21
Payer: COMMERCIAL

## 2021-10-21 VITALS — WEIGHT: 120 LBS | BODY MASS INDEX: 20.49 KG/M2 | HEIGHT: 64 IN

## 2021-10-21 DIAGNOSIS — S46.911A STRAIN OF RIGHT ELBOW, INITIAL ENCOUNTER: Primary | ICD-10-CM

## 2021-10-21 DIAGNOSIS — M77.11 LATERAL EPICONDYLITIS OF RIGHT ELBOW: ICD-10-CM

## 2021-10-21 PROCEDURE — 1036F TOBACCO NON-USER: CPT | Performed by: ORTHOPAEDIC SURGERY

## 2021-10-21 PROCEDURE — G8484 FLU IMMUNIZE NO ADMIN: HCPCS | Performed by: ORTHOPAEDIC SURGERY

## 2021-10-21 PROCEDURE — L3908 WHO COCK-UP NONMOLDE PRE OTS: HCPCS | Performed by: ORTHOPAEDIC SURGERY

## 2021-10-21 PROCEDURE — 20550 NJX 1 TENDON SHEATH/LIGAMENT: CPT | Performed by: ORTHOPAEDIC SURGERY

## 2021-10-21 PROCEDURE — G8427 DOCREV CUR MEDS BY ELIG CLIN: HCPCS | Performed by: ORTHOPAEDIC SURGERY

## 2021-10-21 PROCEDURE — G8420 CALC BMI NORM PARAMETERS: HCPCS | Performed by: ORTHOPAEDIC SURGERY

## 2021-10-21 PROCEDURE — 99214 OFFICE O/P EST MOD 30 MIN: CPT | Performed by: ORTHOPAEDIC SURGERY

## 2021-10-21 RX ORDER — BETAMETHASONE SODIUM PHOSPHATE AND BETAMETHASONE ACETATE 3; 3 MG/ML; MG/ML
6 INJECTION, SUSPENSION INTRA-ARTICULAR; INTRALESIONAL; INTRAMUSCULAR; SOFT TISSUE ONCE
Status: SHIPPED | OUTPATIENT
Start: 2021-10-21

## 2021-10-21 RX ORDER — BUPIVACAINE HYDROCHLORIDE 2.5 MG/ML
5 INJECTION, SOLUTION INFILTRATION; PERINEURAL ONCE
Status: SHIPPED | OUTPATIENT
Start: 2021-10-21

## 2021-10-21 NOTE — PROGRESS NOTES
Dr Ana Nur      Date /Time 10/21/2021             3:35 PM EDT  Name Richie Casillas             1980   Location  [address] [place] [address]  MRN [id number]                Chief Complaint   Patient presents with    Results     TR MRI RIGHT ELBOW        History of Present Illness    Richie Casillas is a 47 y.o. female who presents with right elbow pain    Patient presents to the office today for follow-up visit. Patient is here for MRI results right elbow. At her last visit patient complained of lateral elbow pain. Her lateral elbow pain was increased with lifting activities. I was concerned about cortical irregularities seen on x-rays and ordered the patient an MRI to evaluate for possible occult fracture or other pathology. She is here with continued pain symptoms. Previous History: She presents today for right elbow pain. At last visit we were concerned about tennis elbow. Today her symptoms appear to be a little bit different.       Past Medical History  Past Medical History:   Diagnosis Date    Arthritis     states yes and takes advil    Bipolar depression ([address]     per pt    Depression     Hepatitis C     states Hep C resolved    Heroin use     clean x 7 years    Migraine     MRSA (methicillin resistant staph aureus) culture positive 5/5/13    Positive right hand cx    PONV (postoperative nausea and vomiting)      Past Surgical History:   Procedure Laterality Date    HAND SURGERY  5/5/13    INCISION AND DRAINAGE RT HAND    LEEP N/A 4/6/2021    LOOP ELECTROSURGICAL EXCISION PROCEDURE performed by Lanny Hodgkins, DO at [place] [address] ARTHROSCOPY Left 3/10/2021    VIDEO ARTHROSCOPY LEFT SHOULDER,  ROTATOR CUFF REPAIR, EXTENSIVE DEBRIDEMENT, DECOMPRESSION OF SUBACROMIAL SPACE WITH PARTIAL ACROMIOPLASTY, DISTAL CLAVICLE EXCISION, BICEPS TENODESIS performed by Ana Nur MD at [address] History     Tobacco Use    Smoking status: Former Smoker     Packs/day: 1.00     Years: 1.00     Pack years: 1.00     Types: Cigarettes     Quit date: 3/3/2021     Years since quittin.6    Smokeless tobacco: Never Used   Substance Use Topics    Alcohol use: No      Current Outpatient Medications on File Prior to Visit   Medication Sig Dispense Refill    meloxicam (MOBIC) 15 MG tablet Take 1 tablet by mouth daily 90 tablet 1    ibuprofen (ADVIL;MOTRIN) 800 MG tablet Take 1 tablet by mouth every 8 hours as needed for Pain Take first dosage at 7 pm on 21 9 tablet 0    doxepin (SINEQUAN) 10 MG capsule Take 10 mg by mouth nightly      docusate sodium (COLACE) 100 MG capsule Take 100 mg by mouth 2 times daily as needed for Constipation      polyethylene glycol (GLYCOLAX) 17 g packet Take 17 g by mouth daily as needed for Constipation      ondansetron (ZOFRAN) 4 MG tablet Take 1 tablet by mouth every 8 hours as needed for Nausea or Vomiting 30 tablet 1    Buprenorphine HCl-Naloxone HCl (SUBOXONE SL) Place 15 mg under the tongue      desvenlafaxine succinate (PRISTIQ) 25 MG TB24 extended release tablet TAKE 1 TABLET BY MOUTH DAILY AT APPROXIMATELY THE SAME TIME EVERY DAY *REPLACES CYMBALTA       No current facility-administered medications on file prior to visit. ASCVD 10-YEAR RISK SCORE  The ASCVD Risk score (Loren Duke., et al., 2013) failed to calculate for the following reasons: The 2013 ASCVD risk score is only valid for ages 36 to 78     Review of Systems  10-point ROS is negative other than HPI. Physical Exam  Based off 1997 Exam Criteria  Ht 5' 4\" (1.626 m)   Wt 120 lb (54.4 kg)   BMI 20.60 kg/m²      Constitutional:       General: He is not in acute distress. Appearance: Normal appearance. Cardiovascular:      Rate and Rhythm: Normal rate and regular rhythm. Pulses: Normal pulses. Pulmonary:      Effort: Pulmonary effort is normal. No respiratory distress.    Neurological:      Mental Status: He is alert and oriented to person, place, and time. Mental status is at baseline. Musculoskeletal:  Gait:  normal    Right elbow:    Physical exam of the right elbow demonstrates no significant swelling or ecchymosis. There is point tenderness over the lateral epicondyle. Increased pain with wrist extension against resistance. No gross instability to radial ulnar stress test.    Imaging  Previous X-rays were ordered today reviewed of the right elbow. No evidence of fracture or dislocation. No fat pad sign indicating occult fracture or injury. Slight asymmetry over the distal lateral aspect of the distal humeral cortex, unclear whether its organized periosteal reaction. MRI results:                Assessment and Plan  Faiza Mullen was seen today for results. Diagnoses and all orders for this visit:    Strain of right elbow, initial encounter  -     20605 - [place] DRAIN/INJECT INTERMEDIATE JOINT/BURSA  -     Cancel: Lauren Zazueta Titan Wrist Short Brace  -     Lauren Zazueta Titan Wrist Orthosis Brace    Lateral epicondylitis of right elbow  -     20605 - [place] DRAIN/INJECT INTERMEDIATE JOINT/BURSA  -     Cancel: Lauren Zazueta Titan Wrist Short Brace  -     Lauren Zazueta Titan Wrist Orthosis Brace    Other orders  -     bupivacaine (MARCAINE) 0.25 % injection 5 mg  -     betamethasone acetate-betamethasone sodium phosphate (CELESTONE) injection 6 mg          Patient is suffering from lateral epicondylitis. She is already had physical therapy and has not done well with it. MRI confirms this. Would recommend a lateral elbow injection for lateral epicondylitis and cock up wrist brace. I discussed in detail the risks, benefits, and complications of an injection which included but are not limited to infection, skin reactions, hot swollen joints, and anaphylaxis with the patient. The patient verbalized good understanding and gave informed consent for the right elbow injection. The skin was prepped using sterile alcohol solution.  A sterile 25-gauge needle was inserted into the epicondyle and a mixture of 1ml Beta-Beta, 1 mL of 0.5% bupivacaine,was injected under sterile technique. The needle was withdrawn and the puncture site sealed with a Band-Aid. The patient tolerated the injection well. The patient was instructed to call the office immediately if there is any pain, redness, warmth, fever, or chills. I discussed with Marion Saldana that her history, symptoms, signs and imaging are most consistent with Lateral epicondylitis. We reviewed the natural history of these conditions and treatment options ranging from conservative measures (rest, icing, activity modification, physical therapy, pain meds, cortisone injection) to surgical options. In terms of treatment, I recommended continuing with rest, icing, avoidance of painful activities, NSAIDs or pain meds as tolerated, and physical therapy. We discussed surgical options as well, should conservative measures fail. Electronically signed by Noel Krishna MD on 10/21/2021 at 11:02 AM  This dictation was generated by voice recognition computer software. Although all attempts are made to edit the dictation for accuracy, there may be errors in the transcription that are not intended.

## 2021-10-21 NOTE — LETTER
[address] [place] [address] [facility] [address]  Phone: [phone]  Fax: [phone]    Noel Krishna MD        October 21, 2021     Patient: Marion Saldana   YOB: 1980   Date of Visit: 10/21/2021       To Whom it May Concern:    Agnes Tam was seen in my clinic on 10/21/2021. She should be excused from work 10/20/21. If you have any questions or concerns, please don't hesitate to call.     Sincerely,     MD Nancy Mathews MD

## 2021-11-03 RX ORDER — BETAMETHASONE SODIUM PHOSPHATE AND BETAMETHASONE ACETATE 3; 3 MG/ML; MG/ML
6 INJECTION, SUSPENSION INTRA-ARTICULAR; INTRALESIONAL; INTRAMUSCULAR; SOFT TISSUE ONCE
Status: COMPLETED | OUTPATIENT
Start: 2021-11-03 | End: 2021-11-03

## 2021-11-03 RX ORDER — BUPIVACAINE HYDROCHLORIDE 2.5 MG/ML
5 INJECTION, SOLUTION INFILTRATION; PERINEURAL ONCE
Status: COMPLETED | OUTPATIENT
Start: 2021-11-03 | End: 2021-11-03

## 2021-11-03 RX ADMIN — BETAMETHASONE SODIUM PHOSPHATE AND BETAMETHASONE ACETATE 6 MG: 3; 3 INJECTION, SUSPENSION INTRA-ARTICULAR; INTRALESIONAL; INTRAMUSCULAR; SOFT TISSUE at 10:33

## 2021-11-03 RX ADMIN — BUPIVACAINE HYDROCHLORIDE 5 MG: 2.5 INJECTION, SOLUTION INFILTRATION; PERINEURAL at 10:33

## 2021-12-29 RX ORDER — MELOXICAM 15 MG/1
TABLET ORAL
Qty: 90 TABLET | Refills: 1 | Status: SHIPPED | OUTPATIENT
Start: 2021-12-29 | End: 2022-06-29

## 2022-02-08 ENCOUNTER — OFFICE VISIT (OUTPATIENT)
Dept: ORTHOPEDIC SURGERY | Age: 42
End: 2022-02-08
Payer: COMMERCIAL

## 2022-02-08 VITALS — BODY MASS INDEX: 20.49 KG/M2 | WEIGHT: 120 LBS | HEIGHT: 64 IN

## 2022-02-08 DIAGNOSIS — S46.911A STRAIN OF RIGHT ELBOW, INITIAL ENCOUNTER: ICD-10-CM

## 2022-02-08 DIAGNOSIS — M77.11 LATERAL EPICONDYLITIS OF RIGHT ELBOW: Primary | ICD-10-CM

## 2022-02-08 DIAGNOSIS — M25.521 RIGHT ELBOW PAIN: ICD-10-CM

## 2022-02-08 PROCEDURE — G8420 CALC BMI NORM PARAMETERS: HCPCS | Performed by: ORTHOPAEDIC SURGERY

## 2022-02-08 PROCEDURE — 99213 OFFICE O/P EST LOW 20 MIN: CPT | Performed by: ORTHOPAEDIC SURGERY

## 2022-02-08 PROCEDURE — 1036F TOBACCO NON-USER: CPT | Performed by: ORTHOPAEDIC SURGERY

## 2022-02-08 PROCEDURE — G8484 FLU IMMUNIZE NO ADMIN: HCPCS | Performed by: ORTHOPAEDIC SURGERY

## 2022-02-08 PROCEDURE — G8427 DOCREV CUR MEDS BY ELIG CLIN: HCPCS | Performed by: ORTHOPAEDIC SURGERY

## 2022-02-08 NOTE — PROGRESS NOTES
Dr Blanca Garay      Date /Time 2/8/2022             3:35 PM EDT  Name Madelyn Christian             1980   Location  [address] [facility]  MRN <[id number]>                Chief Complaint   Patient presents with    Follow-up     elbow right inj 0/21/21        History of Present Illness    Madelyn Christian is a 46 y.o. female who presents with right elbow pain    Patient received a cortisone injection at her last visit. The cortisone injection caused an increase in pain for several days. The acute pain did settle down but she continues to cannula chronic pain over the lateral aspect of her elbow. Increased pain with activities and improvement with rest.    Previous history: Patient presents to the office today for follow-up visit. At her last visit patient complained of lateral elbow pain. Her lateral elbow pain was increased with lifting activities. I was concerned about cortical irregularities seen on x-rays and ordered the patient an MRI to evaluate for possible occult fracture or other pathology. She is here with continued pain symptoms. Previous History: She presents today for right elbow pain. At last visit we were concerned about tennis elbow. Today her symptoms appear to be a little bit different.       Past Medical History  Past Medical History:   Diagnosis Date    Arthritis     states yes and takes advil    Bipolar depression ([address]     per pt    Depression     Hepatitis C     states Hep C resolved    Heroin use     clean x 7 years    Migraine     MRSA (methicillin resistant staph aureus) culture positive 5/5/13    Positive right hand cx    PONV (postoperative nausea and vomiting)      Past Surgical History:   Procedure Laterality Date    HAND SURGERY  5/5/13    INCISION AND DRAINAGE RT HAND    LEEP N/A 4/6/2021    LOOP ELECTROSURGICAL EXCISION PROCEDURE performed by Jia Vargas DO at [facility] ARTHROSCOPY Left 3/10/2021    VIDEO ARTHROSCOPY LEFT SHOULDER, ROTATOR CUFF REPAIR, EXTENSIVE DEBRIDEMENT, DECOMPRESSION OF SUBACROMIAL SPACE WITH PARTIAL ACROMIOPLASTY, DISTAL CLAVICLE EXCISION, BICEPS TENODESIS performed by Noreen Santana MD at [address] History     Tobacco Use    Smoking status: Former Smoker     Packs/day: 1.00     Years: 1.00     Pack years: 1.00     Types: Cigarettes     Quit date: 3/3/2021     Years since quittin.9    Smokeless tobacco: Never Used   Substance Use Topics    Alcohol use: No      Current Outpatient Medications on File Prior to Visit   Medication Sig Dispense Refill    meloxicam (MOBIC) 15 MG tablet TAKE 1 TABLET BY MOUTH EVERY DAY 90 tablet 1    ibuprofen (ADVIL;MOTRIN) 800 MG tablet Take 1 tablet by mouth every 8 hours as needed for Pain Take first dosage at 7 pm on 21 9 tablet 0    doxepin (SINEQUAN) 10 MG capsule Take 10 mg by mouth nightly      docusate sodium (COLACE) 100 MG capsule Take 100 mg by mouth 2 times daily as needed for Constipation      polyethylene glycol (GLYCOLAX) 17 g packet Take 17 g by mouth daily as needed for Constipation      ondansetron (ZOFRAN) 4 MG tablet Take 1 tablet by mouth every 8 hours as needed for Nausea or Vomiting 30 tablet 1    Buprenorphine HCl-Naloxone HCl (SUBOXONE SL) Place 15 mg under the tongue      desvenlafaxine succinate (PRISTIQ) 25 MG TB24 extended release tablet TAKE 1 TABLET BY MOUTH DAILY AT APPROXIMATELY THE SAME TIME EVERY DAY *REPLACES CYMBALTA       Current Facility-Administered Medications on File Prior to Visit   Medication Dose Route Frequency Provider Last Rate Last Admin    bupivacaine (MARCAINE) 0.25 % injection 5 mg  5 mg Intra-artICUlar Once Noreen Santana MD        betamethasone acetate-betamethasone sodium phosphate (CELESTONE) injection 6 mg  6 mg Intra-artICUlar Once Noreen Santana MD            ASCVD 10-YEAR RISK SCORE  The ASCVD Risk score (Dang Gavin, et al., 2013) failed to calculate for the following reasons: The 2013 ASCVD risk score is only valid for ages 36 to 78     Review of Systems  10-point ROS is negative other than HPI. Physical Exam  Based off 1997 Exam Criteria  Ht 5' 4\" (1.626 m)   Wt 120 lb (54.4 kg)   BMI 20.60 kg/m²      Constitutional:       General: He is not in acute distress. Appearance: Normal appearance. Cardiovascular:      Rate and Rhythm: Normal rate and regular rhythm. Pulses: Normal pulses. Pulmonary:      Effort: Pulmonary effort is normal. No respiratory distress. Neurological:      Mental Status: He is alert and oriented to person, place, and time. Mental status is at baseline. Musculoskeletal:  Gait:  normal    Right elbow:    Physical exam of the right elbow demonstrates no significant swelling or ecchymosis. There is point tenderness over the lateral epicondyle. Increased pain with wrist extension against resistance. No gross instability to radial ulnar stress test.    Imaging  Previous X-rays were ordered today reviewed of the right elbow. No evidence of fracture or dislocation. No fat pad sign indicating occult fracture or injury. Slight asymmetry over the distal lateral aspect of the distal humeral cortex, unclear whether its organized periosteal reaction. Assessment and Plan  Varghese Altman was seen today for follow-up. Diagnoses and all orders for this visit:    Lateral epicondylitis of right elbow    Strain of right elbow, initial encounter    Right elbow pain          Patient is suffering from lateral epicondylitis. She is already had physical therapy and has not done well with it. MRI confirms this. We have also performed a cortisone injection without good results. At this point we will the patient for hand specialty consultation    I discussed with Maribel Simpson that her history, symptoms, signs and imaging are most consistent with Lateral epicondylitis.     We reviewed the natural history of these conditions and treatment options ranging from conservative measures (rest, icing, activity modification, physical therapy, pain meds, cortisone injection) to surgical options. In terms of treatment, I recommended continuing with rest, icing, avoidance of painful activities, NSAIDs or pain meds as tolerated, and physical therapy. If these are not effective, cortisone injection can be considered. We discussed surgical options as well, should conservative measures fail. Electronically signed by Blanca Garay MD on 2/8/2022 at 3:01 PM  This dictation was generated by voice recognition computer software. Although all attempts are made to edit the dictation for accuracy, there may be errors in the transcription that are not intended.

## 2022-02-16 ENCOUNTER — OFFICE VISIT (OUTPATIENT)
Dept: ORTHOPEDIC SURGERY | Age: 42
End: 2022-02-16
Payer: COMMERCIAL

## 2022-02-16 VITALS — RESPIRATION RATE: 16 BRPM | WEIGHT: 120 LBS | BODY MASS INDEX: 20.49 KG/M2 | HEIGHT: 64 IN

## 2022-02-16 DIAGNOSIS — M77.11 LATERAL EPICONDYLITIS OF RIGHT ELBOW: Primary | ICD-10-CM

## 2022-02-16 PROCEDURE — 99214 OFFICE O/P EST MOD 30 MIN: CPT | Performed by: ORTHOPAEDIC SURGERY

## 2022-02-16 PROCEDURE — G8427 DOCREV CUR MEDS BY ELIG CLIN: HCPCS | Performed by: ORTHOPAEDIC SURGERY

## 2022-02-16 PROCEDURE — G8484 FLU IMMUNIZE NO ADMIN: HCPCS | Performed by: ORTHOPAEDIC SURGERY

## 2022-02-16 PROCEDURE — G8420 CALC BMI NORM PARAMETERS: HCPCS | Performed by: ORTHOPAEDIC SURGERY

## 2022-02-16 NOTE — Clinical Note
Dear  Lauren Veras (Inactive),    Thank you very much for your referral or Ms. Zara Rey to me for evaluation and treatment of her Hand & Wrist condition. I appreciate your confidence in me and thank you for allowing me the opportunity to care for your patients. If I can be of any further assistance to you on this or any other patient, please do not hesitate to contact me. Sincerely,    Ian Hernandez.  Hailey Amezquita MD

## 2022-02-17 NOTE — PATIENT INSTRUCTIONS
Thank you for choosing [facility] for your Hand and Upper Extremity needs. If we can be of any further assistance to you, please do not hesitate to contact us.     Office Phone Number:  [phone]  or  [phone]

## 2022-02-17 NOTE — PROGRESS NOTES
Ms. Michael Hagen is a 46 y.o. right handed woman  who is seen today in Hand Surgical Consultation at the request of Jessenia Gage MD.    She presents today regarding right symptoms which have been present for approximately 1 years. A history of antecedent trauma or injury is Absent. She reports symptoms to include severe pain at the right Lateral elbow(s). Symptoms are worse with certain lifting or gripping activities. Pain is worse with use. She reports moderate pain exacerbation with wrist dorsiflexion. Symptoms show no change over time. Previous treatment has included conservative measures, Prescription medication, Physical Therapy and Steroid injection to the Right, lateral elbow region, all without significant relief. She does not claim relation of her symptoms to her required work activities. She has undergone any form of testing. I have today reviewed with Michael Hagen the clinically relevant, past medical history, medications, allergies,  family history, social history, and Review Of Systems & I have documented any details relevant to today's presenting complaints in my history above. Ms. Solomon Horowitz's self-reported past medical history, medications, allergies,  family history, social history, and Review Of Systems have been scanned into the chart under the \"Media\" tab. Physical Exam:  Ms. Danae Jean most recent vitals:  Vitals  Resp: 16  Height: 5' 4\" (162.6 cm)  Weight: 120 lb (54.4 kg)    She is well nourished, oriented to person, place & time. She demonstrates appropriate mood and affect as well as normal gait and station. Skin: Normal in appearance, Normal Color and Free of Lesions Bilaterally   Digital range of motion is without significant limitation bilaterally. Wrist range of motion is without significant limitation bilaterally.   Elbow range of motion is without significant limitation bilaterally  There is no evidence of gross joint instability bilaterally. Sensation is objectively normal bilaterally. There is a Negative Tinnel's over the Cubital Tunnel on the Right, normal on the Left  Vascular examination reveals normal and good capillary refill bilaterally. Swelling is mild along the Lateral elbow on the Right, normal on the Left  Muscular strength is clinically appropriate bilaterally. Examination for Epicondylar Elbow pain shows moderate tenderness to palpation of the right Lateral epicondylar region(s). Pain is moderately exacerbated with digital and wrist extension against resistance. No obvious elbow instability of the elbow joint is encountered. No other sites of point tenderness are elicited    Radiographic Evaluation:  Radiographs, taken From another [de-identified] Office outside of my practice were Personally Reviewed & Interpreted by myself today (3 views of the right elbow). They demonstrate no evidence of acute fracture, subluxation, or dislocation. There is no degenerative change at the radiocapitellar and/or ulnotrochelar joints. Impression:  Ms. Marissa Fierro has developed Epicondylar Elbow Pain, which is currently exacerbated, and presents requesting further treatment. Plan:    I have had a thorough discussion with Ms. Marissa Fierro regarding the treatment options available for her initially presenting right Epicondylar elbow pain, which is causing her significant symptoms and difficulty. I have outlined for Ms. Marissa Fierro the risk, benefits and consequences of the various treatment modalities, including a reasonable expectation for the long term success of each. We have discussed the fact that further, more aggressive treatment may not be the best solution for her current presenting condition.   Based upon our current discussion and a reasonable understating of the options available to her, Ms. Marissa Fierro has selected to proceed with a conservative plan of treatment consisting of: the use of protective adaptation, activity modification, and the judicious use of over-the-counter anti-inflammatory medications if allowed by her primary care physician. We discussed the option of pursuing formalized hand therapy and a prescription was provided. Instructions were given regarding activity modification as well as suggestions for use of the other modalities were discussed. I have clearly explained to her that the above outlined treatment plan should not be expected to 'cure' her Epicondylar Elbow Pain, but we are rather treating the symptoms with which she presents. She has understood that in order to achieve more durable relief of her symptoms and to prevent future worsening or further damage, that appropriate healing of the damaged tissues is required. Ms. Cristian Simon  voiced an appropriate understanding of our discussion, the options available to her, and of the expectations of her selected  treatment. I have explained to Ms. Cristian Simon  that Recovery after an episode of common extensor origin tendinopathy may take an extended period of time to be complete. I have reminded her that her symptoms should be expected to slowly continue to improve for up to a year or more. She voiced an understanding of the expected time course necessary for improvement. There is no indication for more aggressive intervention such as injection or surgical treatment of her  condition at this time. We discussed the option of pursuing formalized hand therapy and a prescription was provided. I have asked Ms. Cristian Simon  to feel free to contact me or schedule a follow-up appointment at any time that she feels the need for any further evaluation or treatment for her upper extremitiy condition. If she feels that she continues to be feeling and functioning well, she may choose not to seek any further follow-up or treatment at her discretion. I will remain available to continue her care at any time in the future.

## 2022-04-20 LAB — HCG URINE: NEGATIVE

## 2022-06-29 RX ORDER — MELOXICAM 15 MG/1
TABLET ORAL
Qty: 90 TABLET | Refills: 1 | Status: SHIPPED | OUTPATIENT
Start: 2022-06-29

## 2023-08-15 ENCOUNTER — HOSPITAL ENCOUNTER (OUTPATIENT)
Dept: PHYSICAL THERAPY | Age: 43
Setting detail: THERAPIES SERIES
Discharge: HOME | End: 2023-08-15

## 2023-08-15 NOTE — PROGRESS NOTES
Physical Therapy  Cancellation/No-show Note    Patient Name:  Pam Pathak  :  1980   Date:  8/15/2023  Cancels to Date: 0  No-shows to Date: 1 (eval)    For today's appointment patient:  []  Cancelled  []  Rescheduled appointment  [x]  No-show - 8/15/23 (eval)     Reason given by patient:  []  Patient ill  []  Conflicting appointment  []  No transportation    []  Conflict with work  [x]  No reason given  []  Other:     Comments:      Electronically signed by:  Cecily Restrepo, PT, DPT

## 2023-08-21 ENCOUNTER — HOSPITAL ENCOUNTER (OUTPATIENT)
Dept: GENERAL RADIOLOGY | Age: 43
Discharge: HOME | End: 2023-08-21
Payer: COMMERCIAL

## 2023-08-21 ENCOUNTER — HOSPITAL ENCOUNTER (OUTPATIENT)
Age: 43
Discharge: HOME | End: 2023-08-21
Payer: COMMERCIAL

## 2023-08-21 DIAGNOSIS — M25.541 PAIN OF JOINT OF BOTH HANDS: ICD-10-CM

## 2023-08-21 DIAGNOSIS — M25.542 PAIN OF JOINT OF BOTH HANDS: ICD-10-CM

## 2023-08-21 DIAGNOSIS — G89.29 CHRONIC MIDLINE LOW BACK PAIN WITHOUT SCIATICA: ICD-10-CM

## 2023-08-21 DIAGNOSIS — M54.50 CHRONIC MIDLINE LOW BACK PAIN WITHOUT SCIATICA: ICD-10-CM

## 2023-08-21 PROCEDURE — 72100 X-RAY EXAM L-S SPINE 2/3 VWS: CPT

## 2023-08-21 PROCEDURE — 72220 X-RAY EXAM SACRUM TAILBONE: CPT

## 2023-08-21 PROCEDURE — 73130 X-RAY EXAM OF HAND: CPT

## (undated) DEVICE — GLOVE SURG SZ 65 L12IN FNGR THK79MIL GRN LTX FREE

## (undated) DEVICE — [NON-THREADED CANNULA.  DO NOT RESTERILIZE,  DO NOT USE IF PACKAGE IS DAMAGED]: Brand: DRI-LOK

## (undated) DEVICE — CATHETER IV 20GA L1.25IN PNK FEP SFTY STR HUB RADPQ DISP

## (undated) DEVICE — SOLUTION IV 1000ML LAC RINGERS PH 6.5 INJ USP VIAFLX PLAS

## (undated) DEVICE — GLOVE ORTHO 8   MSG9480

## (undated) DEVICE — GLOVE SURG SZ 65 L12IN FNGR THK94MIL STD WHT LTX FREE

## (undated) DEVICE — 3M™ STERI-DRAPE™ U-DRAPE, LONG 1019: Brand: STERI-DRAPE™

## (undated) DEVICE — 40763 BEACH CHAIR HEAD RESTRAINT: Brand: 40763 BEACH CHAIR HEAD RESTRAINT

## (undated) DEVICE — GLOVE SURG SZ 8 L12IN FNGR THK94MIL STD WHT LTX FREE

## (undated) DEVICE — SHEET,DRAPE,53X77,STERILE: Brand: MEDLINE

## (undated) DEVICE — INTENDED FOR TISSUE SEPARATION, AND OTHER PROCEDURES THAT REQUIRE A SHARP SURGICAL BLADE TO PUNCTURE OR CUT.: Brand: BARD-PARKER ® STAINLESS STEEL BLADES

## (undated) DEVICE — PENCIL ES L3M BTTN SWCH S STL HEX LOK BLDE ELECTRD HOLSTER

## (undated) DEVICE — TRAY PROC INTRACERVICAL LEEP DISPOSABLE REDIKIT

## (undated) DEVICE — DYONICS 4.0 MM ELITE                                    ACROMIOBLASTER STRAIGHT DISPOSABLE                                    BURRS, SAGE GREEN, 10000 MAXIMUM                                    RPM, PACKAGED 6 PER BOX, STERILE

## (undated) DEVICE — TUBE IRRIG L8IN LNG PT W/ CONN FOR PMP SYS REDEUCE

## (undated) DEVICE — 4.5 MM INCISOR PLUS STRAIGHT                                    BLADES, POWER/EP-1, VIOLET, PACKAGED                                    6 PER BOX, STERILE

## (undated) DEVICE — GLOVE,SURG,SENSICARE,ALOE,LF,PF,6: Brand: MEDLINE

## (undated) DEVICE — NANOPASS REACH CRESCENT: Brand: NANOPASS

## (undated) DEVICE — SUTURE VCRL + SZ 2-0 L27IN ABSRB WHT SH 1/2 CIR TAPERCUT VCP417H

## (undated) DEVICE — SKIN MARKER,REGULAR TIP WITH RULER AND LABELS: Brand: DEVON

## (undated) DEVICE — BLADE ES ELASTOMERIC COAT INSUL DURABLE BEND UPTO 90DEG

## (undated) DEVICE — PAD,ABDOMINAL,8"X10",ST,LF: Brand: MEDLINE

## (undated) DEVICE — SET ADMIN PRIMING 7ML L30IN 7.35LB 20 GTT 2ND RLER CLMP

## (undated) DEVICE — 1810 FOAM BLOCK NEEDLE COUNTER: Brand: DEVON

## (undated) DEVICE — TUBING ELECSURG SPECLM W/ ADPT DISP

## (undated) DEVICE — ADHESIVE SKIN CLSR 0.7ML TOP DERMBND ADV

## (undated) DEVICE — PACK PROCEDURE SURG ARTHSCP CUST

## (undated) DEVICE — LIGHT HANDLE: Brand: DEVON

## (undated) DEVICE — GAUZE,SPONGE,4"X4",16PLY,STRL,LF,10/TRAY: Brand: MEDLINE

## (undated) DEVICE — COVER,MAYO STAND,STERILE: Brand: MEDLINE

## (undated) DEVICE — TIBURON BEACH CHAIR SHOULDER DRAPE: Brand: CONVERTORS

## (undated) DEVICE — GLOVE SURG SZ 75 L12IN FNGR THK94MIL STD WHT LTX FREE

## (undated) DEVICE — SOLUTION IRRIG 5L LAC R BG

## (undated) DEVICE — DRAPE,UNDERBUTTOCKS,PCH,STERILE: Brand: MEDLINE

## (undated) DEVICE — ELECTRODE PT RET AD L9FT HI MOIST COND ADH HYDRGEL CORDED

## (undated) DEVICE — WEREWOLF FLOW 90 COBLATION WAND: Brand: COBLATION

## (undated) DEVICE — 3M™ STERI-STRIP™ COMPOUND BENZOIN TINCTURE 40 BAGS/CARTON 4 CARTONS/CASE C1544: Brand: 3M™ STERI-STRIP™

## (undated) DEVICE — SUTURE VCRL SZ 3-0 L18IN ABSRB UD PS-2 L19MM 1/2 CIR J497G

## (undated) DEVICE — 1010 S-DRAPE TOWEL DRAPE 10/BX: Brand: STERI-DRAPE™

## (undated) DEVICE — NEEDLE SPNL 22GA L3.5IN BLK HUB S STL REG WALL FIT STYL W/

## (undated) DEVICE — GLOVE,SURG,SENSICARE,ALOE,LF,PF,7: Brand: MEDLINE

## (undated) DEVICE — SUTURE VCRL SZ 3-0 L18IN ABSRB UD PS-2 L19MM 3/8 CRV PRIM J497H

## (undated) DEVICE — ELECTRODE BALL DIA5MM TUNGSTEN LLETZ DURABLE RESIST

## (undated) DEVICE — APPLICATOR MEDICATED 26 CC SOLUTION HI LT ORNG CHLORAPREP

## (undated) DEVICE — INVIEW CLEAR LEGGINGS: Brand: CONVERTORS

## (undated) DEVICE — MEDI-VAC NON-CONDUCTIVE SUCTION TUBING: Brand: CARDINAL HEALTH

## (undated) DEVICE — TUBING SMK EVAC L10FT OD7/8IN L BOR W/ N COND COAT

## (undated) DEVICE — GOWN,SIRUS,NON REINFRCD,LARGE,SET IN SL: Brand: MEDLINE

## (undated) DEVICE — SHOULDER STABILIZATION KIT,                                    DISPOSABLE 12 PER BOX

## (undated) DEVICE — SWAB PROCTOSCOPIC ST 8 IN

## (undated) DEVICE — 3M™ TEGADERM™ TRANSPARENT FILM DRESSING FRAME STYLE, 1624W, 2-3/8 IN X 2-3/4 IN (6 CM X 7 CM), 100/CT 4CT/CASE: Brand: 3M™ TEGADERM™

## (undated) DEVICE — SET GRAV VENT NVENT CK VLV 3 NDL FREE PRT 10 GTT

## (undated) DEVICE — TOWEL,OR,DSP,ST,BLUE,STD,8/PK,10PK/CS: Brand: MEDLINE

## (undated) DEVICE — Device

## (undated) DEVICE — GAMMEX® NON-LATEX SIZE 8, STERILE NEOPRENE POWDER-FREE SURGICAL GLOVE: Brand: GAMMEX

## (undated) DEVICE — SOLUTION IV IRRIG 500ML 0.9% SODIUM CHL 2F7123

## (undated) DEVICE — TRAY PREP DRY W/ PREM GLV 2 APPL 6 SPNG 2 UNDPD 1 OVERWRAP

## (undated) DEVICE — SUTURE VCRL SZ 2-0 L18IN ABSRB UD CT-1 L36MM 1/2 CIR J839D

## (undated) DEVICE — DRAPE,U/ SHT,SPLIT,PLAS,STERIL: Brand: MEDLINE

## (undated) DEVICE — MINOR SET UP PK

## (undated) DEVICE — GAUZE,SPONGE,4"X4",16PLY,XRAY,STRL,LF: Brand: MEDLINE

## (undated) DEVICE — ELECTRODE LOOP 12X20MM SHFT L5IN DIA3/32IN LEEP LLETZ

## (undated) DEVICE — SUTURE MCRYL SZ 3-0 L27IN ABSRB UD L26MM SH 1/2 CIR Y416H

## (undated) DEVICE — TUBING PMP L8FT LNG W/ CONN FOR AR-6400 REDEUCE